# Patient Record
Sex: FEMALE | Race: BLACK OR AFRICAN AMERICAN | NOT HISPANIC OR LATINO | Employment: FULL TIME | URBAN - METROPOLITAN AREA
[De-identification: names, ages, dates, MRNs, and addresses within clinical notes are randomized per-mention and may not be internally consistent; named-entity substitution may affect disease eponyms.]

---

## 2023-02-02 ENCOUNTER — APPOINTMENT (OUTPATIENT)
Dept: GENERAL RADIOLOGY | Facility: HOSPITAL | Age: 23
End: 2023-02-02
Payer: MEDICAID

## 2023-02-02 ENCOUNTER — HOSPITAL ENCOUNTER (EMERGENCY)
Facility: HOSPITAL | Age: 23
Discharge: HOME | End: 2023-02-02
Attending: EMERGENCY MEDICINE | Admitting: EMERGENCY MEDICINE
Payer: MEDICAID

## 2023-02-02 VITALS
TEMPERATURE: 97.7 F | RESPIRATION RATE: 16 BRPM | BODY MASS INDEX: 35.47 KG/M2 | OXYGEN SATURATION: 100 % | HEART RATE: 61 BPM | DIASTOLIC BLOOD PRESSURE: 92 MMHG | WEIGHT: 226 LBS | SYSTOLIC BLOOD PRESSURE: 142 MMHG | HEIGHT: 67 IN

## 2023-02-02 DIAGNOSIS — E03.8 SUBCLINICAL HYPOTHYROIDISM: Primary | ICD-10-CM

## 2023-02-02 DIAGNOSIS — M54.2 NECK PAIN: ICD-10-CM

## 2023-02-02 LAB
ANION GAP SERPL CALCULATED.3IONS-SCNC: 10 MMOL/L (ref 5–15)
B-HCG UR QL: NEGATIVE
BASOPHILS # BLD AUTO: 0.03 10*3/MM3 (ref 0–0.2)
BASOPHILS NFR BLD AUTO: 0.4 % (ref 0–1.5)
BUN SERPL-MCNC: 9 MG/DL (ref 6–20)
BUN/CREAT SERPL: 15.8 (ref 7–25)
CALCIUM SPEC-SCNC: 9.3 MG/DL (ref 8.6–10.5)
CHLORIDE SERPL-SCNC: 106 MMOL/L (ref 98–107)
CO2 SERPL-SCNC: 24 MMOL/L (ref 22–29)
CREAT SERPL-MCNC: 0.57 MG/DL (ref 0.57–1)
DEPRECATED RDW RBC AUTO: 45.5 FL (ref 37–54)
EGFRCR SERPLBLD CKD-EPI 2021: 132.8 ML/MIN/1.73
EOSINOPHIL # BLD AUTO: 0.3 10*3/MM3 (ref 0–0.4)
EOSINOPHIL NFR BLD AUTO: 4.4 % (ref 0.3–6.2)
ERYTHROCYTE [DISTWIDTH] IN BLOOD BY AUTOMATED COUNT: 14.4 % (ref 12.3–15.4)
EXPIRATION DATE: NORMAL
GLUCOSE SERPL-MCNC: 92 MG/DL (ref 65–99)
HCT VFR BLD AUTO: 41.3 % (ref 34–46.6)
HGB BLD-MCNC: 13.5 G/DL (ref 12–15.9)
IMM GRANULOCYTES # BLD AUTO: 0.02 10*3/MM3 (ref 0–0.05)
IMM GRANULOCYTES NFR BLD AUTO: 0.3 % (ref 0–0.5)
INTERNAL NEGATIVE CONTROL: NEGATIVE
INTERNAL POSITIVE CONTROL: POSITIVE
LYMPHOCYTES # BLD AUTO: 1.4 10*3/MM3 (ref 0.7–3.1)
LYMPHOCYTES NFR BLD AUTO: 20.3 % (ref 19.6–45.3)
Lab: NORMAL
MCH RBC QN AUTO: 28.1 PG (ref 26.6–33)
MCHC RBC AUTO-ENTMCNC: 32.7 G/DL (ref 31.5–35.7)
MCV RBC AUTO: 86 FL (ref 79–97)
MONOCYTES # BLD AUTO: 0.49 10*3/MM3 (ref 0.1–0.9)
MONOCYTES NFR BLD AUTO: 7.1 % (ref 5–12)
NEUTROPHILS NFR BLD AUTO: 4.64 10*3/MM3 (ref 1.7–7)
NEUTROPHILS NFR BLD AUTO: 67.5 % (ref 42.7–76)
NRBC BLD AUTO-RTO: 0 /100 WBC (ref 0–0.2)
PLATELET # BLD AUTO: 221 10*3/MM3 (ref 140–450)
PMV BLD AUTO: 10 FL (ref 6–12)
POTASSIUM SERPL-SCNC: 4.2 MMOL/L (ref 3.5–5.2)
RBC # BLD AUTO: 4.8 10*6/MM3 (ref 3.77–5.28)
SODIUM SERPL-SCNC: 140 MMOL/L (ref 136–145)
T4 FREE SERPL-MCNC: 0.98 NG/DL (ref 0.93–1.7)
TSH SERPL DL<=0.05 MIU/L-ACNC: 6.78 UIU/ML (ref 0.27–4.2)
WBC NRBC COR # BLD: 6.88 10*3/MM3 (ref 3.4–10.8)

## 2023-02-02 PROCEDURE — 80048 BASIC METABOLIC PNL TOTAL CA: CPT | Performed by: NURSE PRACTITIONER

## 2023-02-02 PROCEDURE — 99282 EMERGENCY DEPT VISIT SF MDM: CPT

## 2023-02-02 PROCEDURE — 72040 X-RAY EXAM NECK SPINE 2-3 VW: CPT

## 2023-02-02 PROCEDURE — 84443 ASSAY THYROID STIM HORMONE: CPT | Performed by: NURSE PRACTITIONER

## 2023-02-02 PROCEDURE — 84439 ASSAY OF FREE THYROXINE: CPT | Performed by: NURSE PRACTITIONER

## 2023-02-02 PROCEDURE — 36415 COLL VENOUS BLD VENIPUNCTURE: CPT

## 2023-02-02 PROCEDURE — 81025 URINE PREGNANCY TEST: CPT | Performed by: NURSE PRACTITIONER

## 2023-02-02 PROCEDURE — 85025 COMPLETE CBC W/AUTO DIFF WBC: CPT | Performed by: NURSE PRACTITIONER

## 2023-02-02 RX ORDER — FLUCONAZOLE 150 MG/1
TABLET ORAL
COMMUNITY
Start: 2023-01-30 | End: 2023-02-02

## 2023-04-13 ENCOUNTER — HOSPITAL ENCOUNTER (EMERGENCY)
Facility: HOSPITAL | Age: 23
Discharge: HOME | End: 2023-04-13
Attending: EMERGENCY MEDICINE | Admitting: EMERGENCY MEDICINE
Payer: MEDICAID

## 2023-04-13 VITALS
TEMPERATURE: 98.6 F | DIASTOLIC BLOOD PRESSURE: 87 MMHG | SYSTOLIC BLOOD PRESSURE: 146 MMHG | OXYGEN SATURATION: 97 % | RESPIRATION RATE: 13 BRPM | HEART RATE: 94 BPM | HEIGHT: 67 IN | WEIGHT: 225 LBS | BODY MASS INDEX: 35.31 KG/M2

## 2023-04-13 DIAGNOSIS — M54.42 ACUTE LEFT-SIDED BACK PAIN WITH SCIATICA: Primary | ICD-10-CM

## 2023-04-13 DIAGNOSIS — N39.0 ACUTE UTI: ICD-10-CM

## 2023-04-13 LAB
B-HCG UR QL: NEGATIVE
BACTERIA UR QL AUTO: ABNORMAL /HPF
BILIRUB UR QL STRIP: NEGATIVE
CLARITY UR: CLEAR
COLOR UR: YELLOW
EXPIRATION DATE: NORMAL
GLUCOSE UR STRIP-MCNC: NEGATIVE MG/DL
HGB UR QL STRIP.AUTO: NEGATIVE
HYALINE CASTS UR QL AUTO: ABNORMAL /LPF
INTERNAL NEGATIVE CONTROL: NEGATIVE
INTERNAL POSITIVE CONTROL: POSITIVE
KETONES UR QL STRIP: NEGATIVE
LEUKOCYTE ESTERASE UR QL STRIP.AUTO: ABNORMAL
Lab: NORMAL
NITRITE UR QL STRIP: NEGATIVE
PH UR STRIP.AUTO: 5.5 [PH] (ref 5–8)
PROT UR QL STRIP: NEGATIVE
RBC # UR STRIP: ABNORMAL /HPF
REF LAB TEST METHOD: ABNORMAL
SP GR UR STRIP: 1.02 (ref 1–1.03)
SQUAMOUS #/AREA URNS HPF: ABNORMAL /HPF
UROBILINOGEN UR QL STRIP: ABNORMAL
WBC # UR STRIP: ABNORMAL /HPF

## 2023-04-13 PROCEDURE — 25010000002 KETOROLAC TROMETHAMINE PER 15 MG: Performed by: PHYSICIAN ASSISTANT

## 2023-04-13 PROCEDURE — 99283 EMERGENCY DEPT VISIT LOW MDM: CPT

## 2023-04-13 PROCEDURE — 81001 URINALYSIS AUTO W/SCOPE: CPT | Performed by: EMERGENCY MEDICINE

## 2023-04-13 PROCEDURE — 96372 THER/PROPH/DIAG INJ SC/IM: CPT

## 2023-04-13 PROCEDURE — 81025 URINE PREGNANCY TEST: CPT | Performed by: EMERGENCY MEDICINE

## 2023-04-13 RX ORDER — CYCLOBENZAPRINE HCL 10 MG
10 TABLET ORAL 3 TIMES DAILY PRN
Qty: 12 TABLET | Refills: 0 | Status: SHIPPED | OUTPATIENT
Start: 2023-04-13

## 2023-04-13 RX ORDER — KETOROLAC TROMETHAMINE 30 MG/ML
60 INJECTION, SOLUTION INTRAMUSCULAR; INTRAVENOUS ONCE
Status: COMPLETED | OUTPATIENT
Start: 2023-04-13 | End: 2023-04-13

## 2023-04-13 RX ORDER — SODIUM CHLORIDE 9 MG/ML
10 INJECTION INTRAVENOUS AS NEEDED
Status: DISCONTINUED | OUTPATIENT
Start: 2023-04-13 | End: 2023-04-13

## 2023-04-13 RX ADMIN — KETOROLAC TROMETHAMINE 60 MG: 30 INJECTION, SOLUTION INTRAMUSCULAR; INTRAVENOUS at 12:51

## 2023-04-13 NOTE — ED PROVIDER NOTES
Subjective   History of Present Illness  22-year-old female presents emergency department today complaining of pain in the left buttock.  She reports the pain is in the left buttock and goes down to the left leg.  She had no loss of bowel or bladder control.  She denies any dysuria frequency urgency or hematuria.  She had no vaginal discharge no vaginal bleeding.  She states that she had a kidney stone in the past.  But has no pain in the abdomen.  She reports driving over here she is unable to sit normally had to lean to the right side due to the pain in the left buttock.    History provided by:  Patient   used: No    Back Pain  Location:  Sacro-iliac joint  Quality:  Aching and burning  Radiates to:  L posterior upper leg  Pain severity:  Severe  Onset quality:  Gradual  Duration:  2 days  Timing:  Constant  Progression:  Waxing and waning  Chronicity:  New  Context: not falling, not jumping from heights, not MVA, not occupational injury, not recent illness, not recent injury and not twisting    Relieved by: Not sitting on the left buttock.  Worsened by:  Sitting, twisting and bending  Ineffective treatments:  None tried  Associated symptoms: leg pain    Associated symptoms: no abdominal pain, no bladder incontinence, no bowel incontinence, no chest pain, no dysuria, no fever, no headaches, no numbness, no paresthesias, no perianal numbness, no weakness and no weight loss    Risk factors: no hx of osteoporosis, not obese, no steroid use and no vascular disease        Review of Systems   Constitutional: Negative for chills, fever and weight loss.   Respiratory: Negative for chest tightness, shortness of breath and wheezing.    Cardiovascular: Negative for chest pain and palpitations.   Gastrointestinal: Negative for abdominal pain, bowel incontinence, diarrhea and vomiting.   Genitourinary: Negative for bladder incontinence, dysuria, frequency and urgency.   Musculoskeletal: Positive for back  pain. Negative for neck pain.   Skin: Negative for pallor and rash.   Neurological: Negative for weakness, numbness, headaches and paresthesias.   Psychiatric/Behavioral: Negative.    All other systems reviewed and are negative.      Past Medical History:   Diagnosis Date   • Hypertension    • Hypothyroidism    • Lichen sclerosus        Allergies   Allergen Reactions   • Augmentin [Amoxicillin-Pot Clavulanate] Rash       Past Surgical History:   Procedure Laterality Date   • KNEE SURGERY Bilateral     bilateral ACL   • TONSILLECTOMY         History reviewed. No pertinent family history.    Social History     Socioeconomic History   • Marital status: Single   Tobacco Use   • Smoking status: Some Days     Types: Cigarettes   • Smokeless tobacco: Never   Vaping Use   • Vaping Use: Every day   • Substances: Nicotine   Substance and Sexual Activity   • Alcohol use: Not Currently   • Drug use: Yes     Types: Marijuana           Objective   Physical Exam  Vitals and nursing note reviewed.   Constitutional:       Appearance: She is well-developed.   HENT:      Head: Normocephalic and atraumatic.      Right Ear: External ear normal.      Left Ear: External ear normal.      Nose: Nose normal.   Eyes:      General: No scleral icterus.     Conjunctiva/sclera: Conjunctivae normal.      Pupils: Pupils are equal, round, and reactive to light.   Neck:      Thyroid: No thyromegaly.   Cardiovascular:      Rate and Rhythm: Normal rate and regular rhythm.      Heart sounds: Normal heart sounds.   Pulmonary:      Effort: Pulmonary effort is normal. No respiratory distress.      Breath sounds: Normal breath sounds. No wheezing or rales.   Chest:      Chest wall: No tenderness.   Abdominal:      General: Bowel sounds are normal. There is no distension.      Palpations: Abdomen is soft.      Tenderness: There is no abdominal tenderness.   Musculoskeletal:         General: Normal range of motion.      Cervical back: Normal range of motion.  "     Comments: Patient is tender in the left SI joint.  There is no rash no lesions.  Strength 5/5 bilateral lower extremities.  Detert flexes plus one of the knees plus one of the ankles.   Lymphadenopathy:      Cervical: No cervical adenopathy.   Skin:     General: Skin is warm and dry.   Neurological:      Mental Status: She is alert and oriented to person, place, and time.      Cranial Nerves: No cranial nerve deficit.      Coordination: Coordination normal.      Deep Tendon Reflexes: Reflexes are normal and symmetric. Reflexes normal.   Psychiatric:         Behavior: Behavior normal.         Thought Content: Thought content normal.         Judgment: Judgment normal.         Procedures           ED Course  ED Course as of 04/14/23 2023   Thu Apr 13, 2023   1236 Reexamination the patient was sleeping in the room.  We discussed the urine very mild UTI.  No evidence of kidney stone. [HERB]      ED Course User Index  [HERB] Young Zhang PA                 No results found for this or any previous visit (from the past 24 hour(s)).  Note: In addition to lab results from this visit, the labs listed above may include labs taken at another facility or during a different encounter within the last 24 hours. Please correlate lab times with ED admission and discharge times for further clarification of the services performed during this visit.    No orders to display     Vitals:    04/13/23 1115   BP: 146/87   BP Location: Left arm   Patient Position: Sitting   Pulse: 94   Resp: 13   Temp: 98.6 °F (37 °C)   TempSrc: Oral   SpO2: 97%   Weight: 102 kg (225 lb)   Height: 170.2 cm (67\")     Medications   ketorolac (TORADOL) injection 60 mg (60 mg Intramuscular Given 4/13/23 1251)     ECG/EMG Results (last 24 hours)     ** No results found for the last 24 hours. **        No orders to display                                  Medical Decision Making  Pain in the left SI difficult to sit she has a urinalysis that may have a " very slight UTI.  We will treat that with a 3-day course of antibiotics.  She be given Toradol here this clinically is a acute sciatica.  Differential diagnosis #1 pyelonephritis #2 acute sciatica #3 HNP    Acute left-sided back pain with sciatica: acute illness or injury  Acute UTI: acute illness or injury  Amount and/or Complexity of Data Reviewed  Labs: ordered. Decision-making details documented in ED Course.      Risk  Prescription drug management.          Final diagnoses:   Acute left-sided back pain with sciatica   Acute UTI       ED Disposition  ED Disposition     ED Disposition   Discharge    Condition   Stable    Comment   --             Kari Gann, APRN  [address]  [facility] [address] [phone]               Medication List      New Prescriptions    cyclobenzaprine 10 MG tablet  Commonly known as: FLEXERIL  Take 1 tablet by mouth 3 (Three) Times a Day As Needed for Muscle Spasms.     diclofenac 50 MG EC tablet  Commonly known as: VOLTAREN  Take 1 tablet by mouth 3 (Three) Times a Day.           Where to Get Your Medications      These medications were sent to [id number] - [place], [place] - [address] AT [facility] [address] - [phone]  - [phone] FX  [address], located within [facility] [address]    Phone: [phone]   · cyclobenzaprine 10 MG tablet  · diclofenac 50 MG EC tablet          Young Zhang PA  04/14/23 2023

## 2023-04-13 NOTE — Clinical Note
[facility] EMERGENCY DEPARTMENT  [address] [address]  Phone: [phone]    Onel Zapata was seen and treated in our emergency department on 4/13/2023.  She may return to work on 04/15/2023.         Thank you for choosing [facility].    Young Zhang PA

## 2024-02-07 ENCOUNTER — OFFICE VISIT (OUTPATIENT)
Dept: OBSTETRICS AND GYNECOLOGY | Facility: CLINIC | Age: 24
End: 2024-02-07
Payer: MEDICAID

## 2024-02-07 VITALS
WEIGHT: 224 LBS | SYSTOLIC BLOOD PRESSURE: 130 MMHG | DIASTOLIC BLOOD PRESSURE: 80 MMHG | HEIGHT: 67 IN | BODY MASS INDEX: 35.16 KG/M2

## 2024-02-07 DIAGNOSIS — O20.0 THREATENED ABORTION: Primary | ICD-10-CM

## 2024-02-07 NOTE — PROGRESS NOTES
"    Chief Complaint   Patient presents with    Threatened Miscarriage     NGYN          [facility]  Onel Zapata is a 23 y.o. female, , who presents for a NOB visit.    Recent Tests:  She had a urine pregnancy test that was done 2.5 weeks ago that was positive..    US today: Yes.  Findings showed size less than dates,and double bleb appearance.  I have personally evaluated the U/S and agree with the findings.   She has not had prenatal care.  She complains of cramping pain.  The pain is located in her lower abdomen.  Her past medical history is CHTN, thyroid disease, and anxiety.  She does not have passage of tissue.  Rh Status: Unknown  She c/o occasional nausea, vomiting and pink vaginal spotting.    The additional following portions of the patient's history were reviewed and updated as appropriate: allergies, current medications, past family history, past medical history, past social history, past surgical history, and problem list.    Review of Systems  All other systems reviewed and are negative.     I have reviewed and agree with the HPI, ROS, and historical information as entered above. Edilia Munoz MD      Objective   /80   Ht 170.2 cm (67\")   Wt 102 kg (224 lb)   LMP 2023 (Exact Date)   BMI 35.08 kg/m²     Physical Exam  Vitals and nursing note reviewed.   Constitutional:       Appearance: Normal appearance.   HENT:      Head: Normocephalic and atraumatic.   Eyes:      General: No scleral icterus.     Pupils: Pupils are equal, round, and reactive to light.   Pulmonary:      Effort: Pulmonary effort is normal.      Breath sounds: Normal breath sounds.   Abdominal:      General: Bowel sounds are normal. There is no distension.      Palpations: Abdomen is soft.      Tenderness: There is no abdominal tenderness.   Musculoskeletal:         General: Normal range of motion.      Cervical back: Normal range of motion and neck supple.      Right lower leg: No edema.      Left lower leg: No edema. "   Skin:     General: Skin is warm and dry.   Neurological:      General: No focal deficit present.      Mental Status: She is alert and oriented to person, place, and time.   Psychiatric:         Mood and Affect: Mood normal.         Behavior: Behavior normal. Behavior is cooperative.            Assessment and Plan    Problem List Items Addressed This Visit    None  Visit Diagnoses       Threatened     -  Primary    Relevant Orders    TSH    ABO / Rh    US Ob Transvaginal            Threatened AB  US inconsistent with LMP today, and FP with uncertain cardiac activity.  Return in about 1 week (around 2024) for Recheck, U/S Next Visit.    I spent 30 minutes caring for Onel on this date of service. This time includes time spent by me in the following activities:preparing for the visit, reviewing tests, obtaining and/or reviewing a separately obtained history, counseling and educating the patient/family/caregiver, ordering medications, tests, or procedures, and documenting information in the medical record        Edilia Munoz MD  2024

## 2024-02-08 ENCOUNTER — TELEPHONE (OUTPATIENT)
Dept: OBSTETRICS AND GYNECOLOGY | Facility: CLINIC | Age: 24
End: 2024-02-08
Payer: MEDICAID

## 2024-02-08 LAB
ABO GROUP BLD: NORMAL
RH BLD: POSITIVE
TSH SERPL DL<=0.005 MIU/L-ACNC: 4.92 UIU/ML (ref 0.45–4.5)

## 2024-02-08 NOTE — TELEPHONE ENCOUNTER
OB PATIENT SAID SHE IS BLEEDING HEAVIER NOW AND HAS SOME CLOT; WANTING TO KNOW WHAT SHE SHOULD DO.

## 2024-02-08 NOTE — TELEPHONE ENCOUNTER
KS pt.   MBT: A +    Last seen in office yesterday. She had US performed resulting inconsistent with LMP , and FP with uncertain cardiac activity. Recheck in one week with US scheduled.     SW pt. She reports increase in bleeding and passing of clots. RN instructed pt to implement pelvic rest and report to ED if saturating more than 1 pad per hour. Pt VU.

## 2024-02-08 NOTE — TELEPHONE ENCOUNTER
[facility] staff attempted to follow warm transfer process and was unsuccessful     Caller: Onel Zapata    Relationship to patient: Self    Best call back number: [phone] CALL ANYTIME, IT IS OKAY TO [facility].    Patient is needing: PATIENT IS REQUESTING TO SPEAK WITH CLINICAL. CONFIRMED PREGNANCY AT APPT ON 02/07/24. PATIENT HAD SOME SPOTTING YESTERDAY. AFTER APPT SPOTTING HAS INCREASED. BLOOD IS BRIGHT RED TO BROWN WITH SOME TINY BLOOD CLOTS. BLOOD IS NOTICEABLE WHEN WIPING AFTER USING THE RESTROOM. PATIENT IS WEARING A PAD AND NO BLOOD ON PAD. SOME OCCASIONAL PERIOD LIKE CRAMPING THAT IS WORSE WHEN STANDING.    PATIENT WANTS TO SEE IF AN APPT IS NEEDED OR GO TO EMERGENCY ROOM? IF APPT IS NEEDED, PATIENT IS OKAY WITH GOING TO EITHER OFFICE LOCATION. [facility] ADVISED PATIENT TO GO TO EMERGENCY ROOM IF STARTS BLEEDING THROUGH MORE THAN ONE PAD PER HOUR OR PAIN BECOMES SEVERE OR UNBEARABLE.

## 2024-02-08 NOTE — TELEPHONE ENCOUNTER
Patient spoke to the [place] office. Pelvic rest. Go to ER if patient bleeds through a pad in an hour or less. Patient verbalized understanding

## 2024-02-14 ENCOUNTER — OFFICE VISIT (OUTPATIENT)
Dept: OBSTETRICS AND GYNECOLOGY | Facility: CLINIC | Age: 24
End: 2024-02-14
Payer: MEDICAID

## 2024-02-14 VITALS
SYSTOLIC BLOOD PRESSURE: 118 MMHG | BODY MASS INDEX: 35.16 KG/M2 | WEIGHT: 224 LBS | HEIGHT: 67 IN | DIASTOLIC BLOOD PRESSURE: 70 MMHG

## 2024-02-14 DIAGNOSIS — O03.9 MISCARRIAGE: Primary | ICD-10-CM

## 2024-02-28 ENCOUNTER — OFFICE VISIT (OUTPATIENT)
Dept: OBSTETRICS AND GYNECOLOGY | Facility: CLINIC | Age: 24
End: 2024-02-28
Payer: MEDICAID

## 2024-02-28 VITALS
HEIGHT: 67 IN | WEIGHT: 227.2 LBS | SYSTOLIC BLOOD PRESSURE: 124 MMHG | BODY MASS INDEX: 35.66 KG/M2 | DIASTOLIC BLOOD PRESSURE: 70 MMHG

## 2024-02-28 DIAGNOSIS — O03.9 MISCARRIAGE: ICD-10-CM

## 2024-02-28 DIAGNOSIS — Z30.011 ENCOUNTER FOR INITIAL PRESCRIPTION OF CONTRACEPTIVE PILLS: ICD-10-CM

## 2024-02-28 DIAGNOSIS — N91.2 AMENORRHEA: Primary | ICD-10-CM

## 2024-02-28 RX ORDER — NORGESTIMATE AND ETHINYL ESTRADIOL 0.25-0.035
1 KIT ORAL DAILY
Qty: 28 TABLET | Refills: 12 | Status: SHIPPED | OUTPATIENT
Start: 2024-02-28

## 2024-02-28 NOTE — PROGRESS NOTES
Chief Complaint   Patient presents with    Follow-up     SAB follow up       Subjective   HPI  Onel Zapata is a 23 y.o. female, , who presents for spontaneous missed  follow up. She was last seen in office on 2024 with ultrasound that showed GS in cervix. POC removed in office, and Patient reports that bleeding has subsided since that appointment. She denies any pain.     She is wanting to discuss starting birth control. Patient states that she is interested in possibly starting an oral contraceptive.       Additional OB/GYN History   Current contraception: contraceptive methods: None  Desires to: start contraception  Last Pap :   Last Completed Pap Smear       This patient has no relevant Health Maintenance data.          History of abnormal Pap smear: no  Last mammogram:   Last Completed Mammogram       This patient has no relevant Health Maintenance data.          Tobacco Usage?: No   OB History          1    Para        Term                AB   1    Living             SAB   1    IAB        Ectopic        Molar        Multiple        Live Births                    Health Maintenance   Topic Date Due    Annual Gynecologic Pelvic and Breast Exam  Never done    BMI FOLLOWUP  Never done    Pneumococcal Vaccine 0-64 (1 of 2 - PCV) Never done    HPV VACCINES (1 - 2-dose series) Never done    TDAP/TD VACCINES (2 - Td or Tdap) 2022    INFLUENZA VACCINE  Never done    COVID-19 Vaccine (3 - -24 season) 2023    HEPATITIS C SCREENING  Never done    ANNUAL PHYSICAL  Never done    CHLAMYDIA SCREENING  Never done    PAP SMEAR  Never done    MENINGOCOCCAL VACCINE  Completed       The additional following portions of the patient's history were reviewed and updated as appropriate: allergies, current medications, past family history, past medical history, past social history, past surgical history, and problem list.    Review of Systems    I have reviewed and agree with the HPI,  "ROS, and historical information as entered above. Edilia Munoz MD    Objective   /70   Ht 170.2 cm (67\")   Wt 103 kg (227 lb 3.2 oz)   LMP  (LMP Unknown)   BMI 35.58 kg/m²     Physical Exam  Vitals and nursing note reviewed.   Constitutional:       Appearance: Normal appearance.   HENT:      Head: Normocephalic and atraumatic.   Eyes:      General: No scleral icterus.     Pupils: Pupils are equal, round, and reactive to light.   Pulmonary:      Effort: Pulmonary effort is normal.      Breath sounds: Normal breath sounds.   Abdominal:      General: Bowel sounds are normal. There is no distension.      Palpations: Abdomen is soft.      Tenderness: There is no abdominal tenderness.   Musculoskeletal:         General: Normal range of motion.      Cervical back: Normal range of motion and neck supple.      Right lower leg: No edema.      Left lower leg: No edema.   Skin:     General: Skin is warm and dry.   Neurological:      General: No focal deficit present.      Mental Status: She is alert and oriented to person, place, and time.   Psychiatric:         Mood and Affect: Mood normal.         Behavior: Behavior normal. Behavior is cooperative.         Assessment & Plan     Assessment     Problem List Items Addressed This Visit    None  Visit Diagnoses       Amenorrhea    -  Primary    Relevant Orders    HCG, B-subunit, Quantitative    Miscarriage        now completed    Relevant Orders    TSH+Free T4    Encounter for initial prescription of contraceptive pills        Relevant Orders    TSH+Free T4            Appears to be a completed Ab at this time. Check hcg today.   Discussed options for contraception and she desires to start GARFIELD.   Needs TFTs checked, and referral to PCP    Plan     No follow-ups on file.  I spent 20 minutes caring for Onel on this date of service. This time includes time spent by me in the following activities:preparing for the visit, reviewing tests, obtaining and/or reviewing a " separately obtained history, ordering medications, tests, or procedures, and documenting information in the medical record        Edilia Munoz MD  02/28/2024

## 2024-03-06 ENCOUNTER — OFFICE VISIT (OUTPATIENT)
Dept: FAMILY MEDICINE CLINIC | Facility: CLINIC | Age: 24
End: 2024-03-06
Payer: MEDICAID

## 2024-03-06 VITALS
SYSTOLIC BLOOD PRESSURE: 112 MMHG | RESPIRATION RATE: 18 BRPM | DIASTOLIC BLOOD PRESSURE: 66 MMHG | HEIGHT: 67 IN | WEIGHT: 225.2 LBS | HEART RATE: 90 BPM | BODY MASS INDEX: 35.35 KG/M2 | TEMPERATURE: 98 F | OXYGEN SATURATION: 100 %

## 2024-03-06 DIAGNOSIS — E06.3 HYPOTHYROIDISM DUE TO HASHIMOTO'S THYROIDITIS: Primary | ICD-10-CM

## 2024-03-06 DIAGNOSIS — R53.82 CHRONIC FATIGUE: ICD-10-CM

## 2024-03-06 DIAGNOSIS — E03.8 HYPOTHYROIDISM DUE TO HASHIMOTO'S THYROIDITIS: Primary | ICD-10-CM

## 2024-03-06 DIAGNOSIS — E55.9 VITAMIN D DEFICIENCY: ICD-10-CM

## 2024-03-06 DIAGNOSIS — O03.9 MISCARRIAGE: ICD-10-CM

## 2024-03-06 DIAGNOSIS — E01.0 THYROMEGALY: ICD-10-CM

## 2024-03-06 LAB
HCG INTACT+B SERPL-ACNC: 35 MIU/ML
T4 FREE SERPL DIALY-MCNC: 0.94 NG/DL
TSH SERPL-ACNC: 5.4 UU/ML

## 2024-03-07 LAB
25(OH)D3+25(OH)D2 SERPL-MCNC: 17.4 NG/ML (ref 30–100)
ALBUMIN SERPL-MCNC: 4.5 G/DL (ref 4–5)
ALBUMIN/GLOB SERPL: 1.8 {RATIO} (ref 1.2–2.2)
ALP SERPL-CCNC: 58 IU/L (ref 44–121)
ALT SERPL-CCNC: 10 IU/L (ref 0–32)
AST SERPL-CCNC: 17 IU/L (ref 0–40)
BASOPHILS # BLD AUTO: 0 X10E3/UL (ref 0–0.2)
BASOPHILS NFR BLD AUTO: 1 %
BILIRUB SERPL-MCNC: <0.2 MG/DL (ref 0–1.2)
BUN SERPL-MCNC: 9 MG/DL (ref 6–20)
BUN/CREAT SERPL: 12 (ref 9–23)
CALCIUM SERPL-MCNC: 8.9 MG/DL (ref 8.7–10.2)
CHLORIDE SERPL-SCNC: 105 MMOL/L (ref 96–106)
CO2 SERPL-SCNC: 21 MMOL/L (ref 20–29)
CREAT SERPL-MCNC: 0.74 MG/DL (ref 0.57–1)
EGFRCR SERPLBLD CKD-EPI 2021: 117 ML/MIN/1.73
EOSINOPHIL # BLD AUTO: 0.7 X10E3/UL (ref 0–0.4)
EOSINOPHIL NFR BLD AUTO: 18 %
ERYTHROCYTE [DISTWIDTH] IN BLOOD BY AUTOMATED COUNT: 13.2 % (ref 11.7–15.4)
FOLATE SERPL-MCNC: 11 NG/ML
GLOBULIN SER CALC-MCNC: 2.5 G/DL (ref 1.5–4.5)
GLUCOSE SERPL-MCNC: 84 MG/DL (ref 70–99)
HCG INTACT+B SERPL-ACNC: 7 MIU/ML
HCT VFR BLD AUTO: 38 % (ref 34–46.6)
HGB BLD-MCNC: 12.5 G/DL (ref 11.1–15.9)
IMM GRANULOCYTES # BLD AUTO: 0 X10E3/UL (ref 0–0.1)
IMM GRANULOCYTES NFR BLD AUTO: 0 %
IRON SATN MFR SERPL: 12 % (ref 15–55)
IRON SERPL-MCNC: 35 UG/DL (ref 27–159)
LYMPHOCYTES # BLD AUTO: 1.7 X10E3/UL (ref 0.7–3.1)
LYMPHOCYTES NFR BLD AUTO: 39 %
MCH RBC QN AUTO: 29.1 PG (ref 26.6–33)
MCHC RBC AUTO-ENTMCNC: 32.9 G/DL (ref 31.5–35.7)
MCV RBC AUTO: 88 FL (ref 79–97)
MONOCYTES # BLD AUTO: 0.4 X10E3/UL (ref 0.1–0.9)
MONOCYTES NFR BLD AUTO: 9 %
NEUTROPHILS # BLD AUTO: 1.4 X10E3/UL (ref 1.4–7)
NEUTROPHILS NFR BLD AUTO: 33 %
PLATELET # BLD AUTO: 209 X10E3/UL (ref 150–450)
POTASSIUM SERPL-SCNC: 4.1 MMOL/L (ref 3.5–5.2)
PROT SERPL-MCNC: 7 G/DL (ref 6–8.5)
RBC # BLD AUTO: 4.3 X10E6/UL (ref 3.77–5.28)
SODIUM SERPL-SCNC: 143 MMOL/L (ref 134–144)
T3FREE SERPL-MCNC: 3.4 PG/ML (ref 2–4.4)
T4 FREE SERPL-MCNC: 0.92 NG/DL (ref 0.82–1.77)
THYROGLOB AB SERPL-ACNC: 2.7 IU/ML (ref 0–0.9)
THYROPEROXIDASE AB SERPL-ACNC: 393 IU/ML (ref 0–34)
TIBC SERPL-MCNC: 290 UG/DL (ref 250–450)
TSH SERPL DL<=0.005 MIU/L-ACNC: 6.76 UIU/ML (ref 0.45–4.5)
UIBC SERPL-MCNC: 255 UG/DL (ref 131–425)
VIT B12 SERPL-MCNC: 400 PG/ML (ref 232–1245)
WBC # BLD AUTO: 4.2 X10E3/UL (ref 3.4–10.8)

## 2024-03-11 ENCOUNTER — TELEPHONE (OUTPATIENT)
Dept: FAMILY MEDICINE CLINIC | Facility: CLINIC | Age: 24
End: 2024-03-11

## 2024-03-11 DIAGNOSIS — E03.8 HYPOTHYROIDISM DUE TO HASHIMOTO'S THYROIDITIS: Primary | ICD-10-CM

## 2024-03-11 DIAGNOSIS — E06.3 HYPOTHYROIDISM DUE TO HASHIMOTO'S THYROIDITIS: Primary | ICD-10-CM

## 2024-03-11 RX ORDER — THYROID 30 MG/1
30 TABLET ORAL DAILY
Qty: 30 TABLET | Refills: 1 | Status: SHIPPED | OUTPATIENT
Start: 2024-03-11

## 2024-03-11 NOTE — TELEPHONE ENCOUNTER
Caller: Onel Zapata    Relationship: Self    Best call back number: [phone]    What test was performed: BLOOD WORK    When was the test performed: 03/06/24    Where was the test performed: OFFICE     Additional notes: PATIENT WOULD LIKE TO SPEAK WITH CLINICAL ABOUT RESULTS    PLEASE ADVISE

## 2024-03-15 ENCOUNTER — PATIENT ROUNDING (BHMG ONLY) (OUTPATIENT)
Dept: FAMILY MEDICINE CLINIC | Facility: CLINIC | Age: 24
End: 2024-03-15
Payer: MEDICAID

## 2024-03-15 PROBLEM — E03.8 HYPOTHYROIDISM DUE TO HASHIMOTO'S THYROIDITIS: Status: ACTIVE | Noted: 2024-03-15

## 2024-03-15 PROBLEM — E06.3 HYPOTHYROIDISM DUE TO HASHIMOTO'S THYROIDITIS: Status: ACTIVE | Noted: 2024-03-15

## 2024-03-15 PROBLEM — E01.0 THYROMEGALY: Status: ACTIVE | Noted: 2024-03-15

## 2024-03-15 NOTE — PROGRESS NOTES
A My-Chart message has been sent to the patient for PATIENT ROUNDING with [facility].

## 2024-05-08 DIAGNOSIS — E03.8 HYPOTHYROIDISM DUE TO HASHIMOTO'S THYROIDITIS: ICD-10-CM

## 2024-05-08 DIAGNOSIS — E06.3 HYPOTHYROIDISM DUE TO HASHIMOTO'S THYROIDITIS: ICD-10-CM

## 2024-05-10 ENCOUNTER — TELEPHONE (OUTPATIENT)
Dept: OBSTETRICS AND GYNECOLOGY | Facility: CLINIC | Age: 24
End: 2024-05-10

## 2024-05-10 RX ORDER — THYROID 30 MG/1
30 TABLET ORAL DAILY
Qty: 30 TABLET | Refills: 1 | Status: SHIPPED | OUTPATIENT
Start: 2024-05-10

## 2024-05-10 NOTE — TELEPHONE ENCOUNTER
Provider: DR JACOBS    Caller: IAIN MANRIQUEZ    Relationship to Patient: SELF    Phone Number: [phone]    Reason for Call: PATIENT CALLED AND STATED THAT SHE HAD A MISCARRIAGE IN FEBRUARY AND IS CONCERNED ABOUT RECENT ABNORMAL BLEEDING POSSIBLY BEING RELATED TO THE MISCARRIAGE OR A POSSIBLE INFECTION    When was the patient last seen: 2/28/24    When did it start: PATIENT ENDED PERIOD 5/5/24 AND STARTED BLEEDING AGAIN AFTER INTERCOURSE THE EVENING OF 5/7/24    Characteristics of symptom/severity: ABNORMAL BLEEDING AFTER INTERCOURSE - STARTED AS LIGHT BLEEDING THAT NIGHT AND THEN HEAVIER LIKE A PERIOD THE NEXT MORNING AND HASN'T STOPPED SINCE

## 2024-06-03 ENCOUNTER — TELEPHONE (OUTPATIENT)
Dept: OBSTETRICS AND GYNECOLOGY | Facility: CLINIC | Age: 24
End: 2024-06-03

## 2024-06-03 NOTE — TELEPHONE ENCOUNTER
Hub staff attempted to follow warm transfer process and was unsuccessful     Caller: Onel Zapata    Relationship to patient: Self    Best call back number: [phone]    Patient is needing: PT RETURNING MISSED CALL FROM JASMEET IN THE OFFICE.

## 2024-06-03 NOTE — TELEPHONE ENCOUNTER
Hub staff attempted to follow warm transfer process and was unsuccessful     Caller: IAIN MANRIQUEZ    Relationship to patient: SELF     Best call back number: [phone]     Patient is needing: PT IS CALLING IN TO SCHEDULE A NEW OB APPT. PT HAS A POSITIVE PREGNANCY TEST BUT LMP IS UNKNOWN BELIEVES LAST PERIOD WAS THE BEGINNING OF APRIL.    SPOKE TO NONCLINICAL AND ADVISED TO SEND TE FOR OB LAB WORK TO BE ORDERED BEFORE SCHEDULING

## 2024-06-04 ENCOUNTER — TELEPHONE (OUTPATIENT)
Dept: OBSTETRICS AND GYNECOLOGY | Facility: CLINIC | Age: 24
End: 2024-06-04
Payer: MEDICAID

## 2024-06-04 DIAGNOSIS — Z32.00 POSSIBLE PREGNANCY, NOT CONFIRMED: Primary | ICD-10-CM

## 2024-06-04 NOTE — TELEPHONE ENCOUNTER
Spoke with patient. Patient reports she had a positive UPT on Saturday. She reports her LMP is unknown. She thinks she may have had one at the beginning of April but is uncertain. She was on birth control but stopped with a positive UPT. Pt reports she had a miscarriage in Feb 2024. Pt to come in for an HCG and progesterone level. She will come in tomorrow as she is at work today. Orders placed.      -LAUREEN Huitron

## 2024-06-05 ENCOUNTER — LAB (OUTPATIENT)
Dept: OBSTETRICS AND GYNECOLOGY | Facility: CLINIC | Age: 24
End: 2024-06-05
Payer: MEDICAID

## 2024-06-06 LAB
HCG INTACT+B SERPL-ACNC: NORMAL MIU/ML
PROGEST SERPL-MCNC: 18.5 NG/ML

## 2024-07-10 ENCOUNTER — INITIAL PRENATAL (OUTPATIENT)
Dept: OBSTETRICS AND GYNECOLOGY | Facility: CLINIC | Age: 24
End: 2024-07-10
Payer: MEDICAID

## 2024-07-10 VITALS — BODY MASS INDEX: 34.77 KG/M2 | DIASTOLIC BLOOD PRESSURE: 72 MMHG | WEIGHT: 222 LBS | SYSTOLIC BLOOD PRESSURE: 118 MMHG

## 2024-07-10 DIAGNOSIS — Z86.79 HISTORY OF CHRONIC HYPERTENSION: ICD-10-CM

## 2024-07-10 DIAGNOSIS — F12.90 MARIJUANA USE DURING PREGNANCY: ICD-10-CM

## 2024-07-10 DIAGNOSIS — O99.330 TOBACCO USE DURING PREGNANCY, ANTEPARTUM: ICD-10-CM

## 2024-07-10 DIAGNOSIS — E03.8 HYPOTHYROIDISM DUE TO HASHIMOTO'S THYROIDITIS: ICD-10-CM

## 2024-07-10 DIAGNOSIS — Z3A.10 10 WEEKS GESTATION OF PREGNANCY: ICD-10-CM

## 2024-07-10 DIAGNOSIS — E06.3 HYPOTHYROIDISM DUE TO HASHIMOTO'S THYROIDITIS: ICD-10-CM

## 2024-07-10 DIAGNOSIS — O99.320 MARIJUANA USE DURING PREGNANCY: ICD-10-CM

## 2024-07-10 DIAGNOSIS — Z34.90 PRENATAL CARE, ANTEPARTUM: Primary | ICD-10-CM

## 2024-07-10 PROBLEM — E01.0 THYROMEGALY: Status: RESOLVED | Noted: 2024-03-15 | Resolved: 2024-07-10

## 2024-07-10 RX ORDER — PRENATAL VIT NO.126/IRON/FOLIC 28MG-0.8MG
TABLET ORAL DAILY
COMMUNITY

## 2024-07-10 NOTE — PROGRESS NOTES
Initial ob visit     CC- Here for care of pregnancy        Onel Zapata is a 23 y.o. female, , who presents for her first obstetrical visit.  Patient's last menstrual period was 2024.. Her JENISE is 2025, by Ultrasound. Current GA is 10w3d.     Initial positive test date : 24, UPT        Her periods are every 28 days.  Prior obstetric issues: none  Patient's past medical history is significant for: thyroid dysfunction.  She follows with Eliana Hodgson for treatment.  Family history of genetic issues (includes FOB): none  Prior infections concerning in pregnancy (Rash, fever in last 2 weeks): No  Varicella Hx - vaccinated  Prior testing for Cystic Fibrosis Carrier or Sickle Cell Trait- none  Prepregnancy BMI - Body mass index is 34.77 kg/m².  History of STD: no  Hx of HSV for patient or partner: no  Ultrasound Today: yes    OB History    Para Term  AB Living   2       1     SAB IAB Ectopic Molar Multiple Live Births   1                # Outcome Date GA Lbr Mukund/2nd Weight Sex Type Anes PTL Lv   2 Current            1 SAB                Additional Pertinent History   Last Pap : 2023 Result: normal per pt. Pt would like pap smear PP     Last Completed Pap Smear       This patient has no relevant Health Maintenance data.          History of abnormal Pap smear: no  Family history of uterine, colon, breast, or ovarian cancer: no  Feelings of Anxiety or Depression: no  Tobacco Usage?: Yes Onel Zapata  reports that she vapes nicotine daily. She has never used smokeless tobacco. I have educated her on the risk of diseases from using tobacco products such as cancer, COPD, heart disease, reproductive problems, and low birth weight.     I advised her to quit and she is not willing to quit.            Alcohol/Drug Use?: YES Drug: regular daily use  Over the age of 35 at delivery: no  Genetic Screening: desires cell free DNA    PMH    Current Outpatient Medications:     prenatal  vitamin (prenatal, CLASSIC, vitamin) tablet, Take  by mouth Daily., Disp: , Rfl:      Past Medical History:   Diagnosis Date    Arthritis     Hypertension     Hypothyroidism     Kidney stone     Lichen sclerosus     Ovarian cyst         Past Surgical History:   Procedure Laterality Date    KNEE SURGERY Bilateral     bilateral ACL    LAPAROSCOPIC CHOLECYSTECTOMY  2021    TONSILLECTOMY         Review of Systems   Review of Systems    Patient Reports:  nausea and occasional vomiting  Patient Denies:excessive nausea , excessive vomiting, and vaginal bleeding  All systems reviewed and otherwise normal.    I have reviewed and agree with the HPI, ROS, and historical information as entered above. Edilia Munoz MD      /72   Wt 101 kg (222 lb)   LMP 05/04/2024   BMI 34.77 kg/m²     The additional following portions of the patient's history were reviewed and updated as appropriate: allergies, current medications, past family history, past medical history, past social history, past surgical history, and problem list.    Physical Exam  General:  well developed; well nourished  no acute distress   Chest/Respiratory: No labored breathing, normal respiratory effort, normal appearance, no respiratory noises noted   Heart:  not examined   Thyroid: not examined   Breasts:  Not performed.   Abdomen: Not performed.   Pelvis: Not performed.        Assessment and Plan    Problem List Items Addressed This Visit          Gynecologic and Obstetric Problems    Marijuana use during pregnancy       Other    Hypothyroidism due to Hashimoto's thyroiditis    Overview     TSH 6 prior to nob, but not taking synthroid. Refer endocrine.          Relevant Orders    TSH    Ambulatory Referral to Endocrinology    Pregnancy    Overview     cfDNA         Tobacco use during pregnancy, antepartum    History of chronic hypertension    Overview     Resolved after weight loss 2-3 yrs ago. Was on 2 different meds. But normal since then   Baby asa           Other Visit Diagnoses       Prenatal care, antepartum    -  Primary    Relevant Orders    Obstetric Panel    HIV-1 / O / 2 Ag / Antibody    Urine Culture - Urine, Urine, Clean Catch    Urinalysis With Microscopic - Urine, Clean Catch    Chlamydia trachomatis, Neisseria gonorrhoeae, PCR - Urine, Urine, Clean Catch    Urine Drug Screen - Urine, Clean Catch    TSH    [id number] PLUS Core+SCA+ESS - Blood,            Pregnancy at 10w3d  Reviewed routine prenatal care with the office and educational materials given  Lab(s) Ordered  Discussed options for genetic testing including first trimester nuchal translucency screen, genetic disease carrier testing, quadruple screen, and NIPT  Discontinue the use of all non-medicinal drugs and chemicals  Patient is on Prenatal vitamins  Activity recommendation : 150 minutes/week of moderate intensity aerobic activity unless we limit for bleeding, hypertension or other pregnancy complication   TFT today  discussed baby aspirin from 10-36 weeks for prevention of preeclampsia   Return in about 1 month (around 8/10/2024) for F/U Prenatal.      Edilia Munoz MD  07/10/2024

## 2024-07-11 LAB — MED LIST OPTION NOT SELECTED: NORMAL

## 2024-07-12 RX ORDER — CEPHALEXIN 500 MG/1
500 CAPSULE ORAL 3 TIMES DAILY
Qty: 21 CAPSULE | Refills: 0 | Status: SHIPPED | OUTPATIENT
Start: 2024-07-12 | End: 2024-07-19

## 2024-07-14 LAB
5P15 DELETION (CRI-DU-CHAT): NOT DETECTED
ABO GROUP BLD: NORMAL
AMPHETAMINES UR QL SCN: NEGATIVE NG/ML
APPEARANCE UR: CLEAR
BACTERIA #/AREA URNS HPF: NORMAL /[HPF]
BACTERIA UR CULT: ABNORMAL
BARBITURATES UR QL SCN: NEGATIVE NG/ML
BASOPHILS # BLD AUTO: 0 X10E3/UL (ref 0–0.2)
BASOPHILS NFR BLD AUTO: 1 %
BENZODIAZ UR QL SCN: NEGATIVE NG/ML
BILIRUB UR QL STRIP: NEGATIVE
BLD GP AB SCN SERPL QL: NEGATIVE
BZE UR QL SCN: NEGATIVE NG/ML
C TRACH RRNA SPEC QL NAA+PROBE: NEGATIVE
CANNABINOIDS UR QL SCN: POSITIVE NG/ML
CASTS URNS QL MICRO: NORMAL /LPF
CFDNA.FET/CFDNA.TOTAL SFR FETUS: NORMAL %
CITATION REF LAB TEST: NORMAL
COLOR UR: YELLOW
CREAT UR-MCNC: 109.1 MG/DL (ref 20–300)
EOSINOPHIL # BLD AUTO: 0.2 X10E3/UL (ref 0–0.4)
EOSINOPHIL NFR BLD AUTO: 3 %
EPI CELLS #/AREA URNS HPF: NORMAL /HPF (ref 0–10)
ERYTHROCYTE [DISTWIDTH] IN BLOOD BY AUTOMATED COUNT: 13.6 % (ref 11.7–15.4)
FET 13+18+21+X+Y ANEUP PLAS.CFDNA: NEGATIVE
FET 1P36 DEL RISK WBC.DNA+CFDNA QL: NOT DETECTED
FET 22Q11.2 DEL RISK WBC.DNA+CFDNA QL: NOT DETECTED
FET CHR 11Q23 DEL PLAS.CFDNA QL: NOT DETECTED
FET CHR 15Q11 DEL PLAS.CFDNA QL: NOT DETECTED
FET CHR 21 TS PLAS.CFDNA QL: NEGATIVE
FET CHR 4P16 DEL PLAS.CFDNA QL: NOT DETECTED
FET CHR 8Q24 DEL PLAS.CFDNA QL: NOT DETECTED
FET MS X RISK WBC.DNA+CFDNA QL: NOT DETECTED
FET SEX PLAS.CFDNA DOSAGE CFDNA: NORMAL
FET TS 13 RISK PLAS.CFDNA QL: NEGATIVE
FET TS 18 RISK WBC.DNA+CFDNA QL: NEGATIVE
FET X + Y ANEUP RISK PLAS.CFDNA SEQ-IMP: NOT DETECTED
GA EST FROM CONCEPTION DATE: NORMAL D
GESTATIONAL AGE > 9:: YES
GLUCOSE UR QL STRIP: NEGATIVE
HBV SURFACE AG SERPL QL IA: NEGATIVE
HCT VFR BLD AUTO: 37.1 % (ref 34–46.6)
HCV IGG SERPL QL IA: NON REACTIVE
HGB BLD-MCNC: 12.4 G/DL (ref 11.1–15.9)
HGB UR QL STRIP: NEGATIVE
HIV 1+2 AB+HIV1 P24 AG SERPL QL IA: NON REACTIVE
IMM GRANULOCYTES # BLD AUTO: 0 X10E3/UL (ref 0–0.1)
IMM GRANULOCYTES NFR BLD AUTO: 0 %
KETONES UR QL STRIP: NEGATIVE
LAB DIRECTOR NAME PROVIDER: NORMAL
LAB DIRECTOR NAME PROVIDER: NORMAL
LABORATORY COMMENT REPORT: ABNORMAL
LABORATORY COMMENT REPORT: NORMAL
LEUKOCYTE ESTERASE UR QL STRIP: NEGATIVE
LIMITATIONS OF THE TEST: NORMAL
LYMPHOCYTES # BLD AUTO: 1.3 X10E3/UL (ref 0.7–3.1)
LYMPHOCYTES NFR BLD AUTO: 24 %
MCH RBC QN AUTO: 28.4 PG (ref 26.6–33)
MCHC RBC AUTO-ENTMCNC: 33.4 G/DL (ref 31.5–35.7)
MCV RBC AUTO: 85 FL (ref 79–97)
METHADONE UR QL SCN: NEGATIVE NG/ML
MICRO URNS: NORMAL
MICRO URNS: NORMAL
MONOCYTES # BLD AUTO: 0.4 X10E3/UL (ref 0.1–0.9)
MONOCYTES NFR BLD AUTO: 7 %
N GONORRHOEA RRNA SPEC QL NAA+PROBE: NEGATIVE
NEGATIVE PREDICTIVE VALUE: NORMAL
NEUTROPHILS # BLD AUTO: 3.6 X10E3/UL (ref 1.4–7)
NEUTROPHILS NFR BLD AUTO: 65 %
NITRITE UR QL STRIP: NEGATIVE
NOTE: NORMAL
OPIATES UR QL SCN: NEGATIVE NG/ML
OXYCODONE+OXYMORPHONE UR QL SCN: NEGATIVE NG/ML
PCP UR QL: NEGATIVE NG/ML
PERFORMANCE CHARACTERISTICS: NORMAL
PH UR STRIP: 6.5 [PH] (ref 5–7.5)
PH UR: 6.7 [PH] (ref 4.5–8.9)
PLATELET # BLD AUTO: 216 X10E3/UL (ref 150–450)
POSITIVE PREDICTIVE VALUE: NORMAL
PROPOXYPH UR QL SCN: NEGATIVE NG/ML
PROT UR QL STRIP: NEGATIVE
RBC # BLD AUTO: 4.37 X10E6/UL (ref 3.77–5.28)
RBC #/AREA URNS HPF: NORMAL /HPF (ref 0–2)
REF LAB TEST METHOD: NORMAL
RH BLD: POSITIVE
RPR SER QL: NON REACTIVE
RUBV IGG SERPL IA-ACNC: 2.37 INDEX
SP GR UR STRIP: 1.02 (ref 1–1.03)
TEST PERFORMANCE INFO SPEC: NORMAL
TRIOSOMY 16: NOT DETECTED
TRISOMY 22: NOT DETECTED
TSH SERPL DL<=0.005 MIU/L-ACNC: 5.94 UIU/ML (ref 0.45–4.5)
UROBILINOGEN UR STRIP-MCNC: 0.2 MG/DL (ref 0.2–1)
WBC # BLD AUTO: 5.5 X10E3/UL (ref 3.4–10.8)
WBC #/AREA URNS HPF: NORMAL /HPF (ref 0–5)

## 2024-07-16 ENCOUNTER — OFFICE VISIT (OUTPATIENT)
Dept: ENDOCRINOLOGY | Facility: CLINIC | Age: 24
End: 2024-07-16
Payer: MEDICAID

## 2024-07-16 VITALS
HEIGHT: 67 IN | SYSTOLIC BLOOD PRESSURE: 106 MMHG | BODY MASS INDEX: 34.21 KG/M2 | OXYGEN SATURATION: 99 % | DIASTOLIC BLOOD PRESSURE: 70 MMHG | WEIGHT: 218 LBS | HEART RATE: 89 BPM

## 2024-07-16 DIAGNOSIS — E55.9 VITAMIN D DEFICIENCY: ICD-10-CM

## 2024-07-16 DIAGNOSIS — Z3A.11 11 WEEKS GESTATION OF PREGNANCY: ICD-10-CM

## 2024-07-16 DIAGNOSIS — E03.9 ACQUIRED HYPOTHYROIDISM: Primary | ICD-10-CM

## 2024-07-16 DIAGNOSIS — E01.0 THYROMEGALY: ICD-10-CM

## 2024-07-16 PROCEDURE — 99244 OFF/OP CNSLTJ NEW/EST MOD 40: CPT | Performed by: PHYSICIAN ASSISTANT

## 2024-07-16 PROCEDURE — 1159F MED LIST DOCD IN RCRD: CPT | Performed by: PHYSICIAN ASSISTANT

## 2024-07-16 PROCEDURE — 1160F RVW MEDS BY RX/DR IN RCRD: CPT | Performed by: PHYSICIAN ASSISTANT

## 2024-07-16 RX ORDER — LEVOTHYROXINE SODIUM 0.07 MG/1
75 TABLET ORAL DAILY
Qty: 30 TABLET | Refills: 2 | Status: SHIPPED | OUTPATIENT
Start: 2024-07-16 | End: 2025-07-16

## 2024-07-16 NOTE — PROGRESS NOTES
Chief Complaint:   Onel Zapata is a 23 y.o. female who is being seen today for  Hypothyroidism . Referred by Edilia Munoz MD.     HPI     23 y.o. female with Lychen sclerosus presents for consultation regarding hypothyroidism.     She is  with hx of miscarriage in 2024.   She is currently 11 weeks gestation.   She feels tired with occasional nausea since pregnancy.   She has dry skin on elbows - chronic.   Hair falls out a lot - chronic.   Feels like her thyroid is big - chronic. No trouble swallowing or other compressive symptoms.   She was dx with hyperthyroidism when she was 7-7 yo and had treatment that caused her thyroid to be hypothyroid - I assume radioactive iodine. She was put on levothyroxine and took that for years on and off. She felt better off the medicine than on the medicine. She was put on [place] thyroid  and she took it for a 3-4 weeks but felt more tired on it and stopped taking it. She was also concerned with possible heart problems with armour she read about.   She is currently not on any medication. TSH on 7/10/24 was elevated at 5.94.     Hx vit d deficiency. Took OCT supplement in past. Vit D was 17 3/2024.     Works at a .    Paternal aunt has thyroid problems - info lacking.       The following portions of the patient's history were reviewed and updated by me as appropriate: allergies, current medications, past family history, past social history, past surgical history and problem list.    Review of Systems  Review of Systems   Constitutional:  Positive for fatigue.   Gastrointestinal:  Positive for nausea.   Skin:         Hair loss, dry skin   All other systems reviewed and are negative.       Current medications:  Current Outpatient Medications   Medication Sig Dispense Refill    cephalexin (Keflex) 500 MG capsule Take 1 capsule by mouth 3 (Three) Times a Day for 7 days. 21 capsule 0    prenatal vitamin (prenatal, CLASSIC, vitamin) tablet Take  by mouth Daily.        No current facility-administered medications for this visit.       Physical Exam   Vitals:    07/16/24 0751   BP: 106/70   Pulse: 89   SpO2: 99%   Body mass index is 34.14 kg/m².  Physical Exam  Constitutional:       General: She is not in acute distress.     Appearance: She is well-developed. She is not diaphoretic.   Eyes:      General: Lids are normal.   Neck:      Thyroid: No thyroid mass or thyromegaly.      Vascular: No JVD.      Trachea: No tracheal deviation.   Cardiovascular:      Rate and Rhythm: Normal rate and regular rhythm.   Pulmonary:      Effort: Pulmonary effort is normal.      Breath sounds: Normal breath sounds.   Musculoskeletal:         General: No tenderness.   Lymphadenopathy:      Cervical: No cervical adenopathy.   Skin:     General: Skin is warm and dry.      Findings: No erythema or rash.   Neurological:      Mental Status: She is alert and oriented to person, place, and time.   Psychiatric:         Speech: Speech normal.         Behavior: Behavior normal.         Thought Content: Thought content normal.         Labs and Imaging   Lab Results   Component Value Date    TSH 5.940 (H) 07/10/2024    THYROIDAB 393 (H) 03/06/2024           Assessment / Plan     Diagnoses and all orders for this visit:    1. Acquired hypothyroidism (Primary)  -     levothyroxine (Synthroid) 75 MCG tablet; Take 1 tablet by mouth Daily.  Dispense: 30 tablet; Refill: 2    2. 11 weeks gestation of pregnancy    3. Thyromegaly  -     US Thyroid; Future    4. Vitamin D deficiency        Problem List Items Addressed This Visit    None    Diagnosis was discussed and reviewed with the patient including the advantages of drug therapy.        Patient appears clinically hypothyroid. Recent TSH 5.9 with goal <2.5 during pregnancy. Start levothyroxine 75 mcg daily. Reviewed proper absorption. If a pill is missed she can take two the following day. We will repeat TFTs at f/u in 4 weeks. Check thyroid u/s to check for any  nodules. Discussed risks of untreated or under treated hypothyroidism during pregnancy.   Recommended resuming vitamin D supplement based on low level in March. I will repeat vit d next visit.     F/u 4 weeks      Signed by: Leo Foster PA-C  Endocrinology  07/16/2024

## 2024-07-16 NOTE — LETTER
2024     Edilia Munoz MD  [address]  [address]  [facility] [address]    Patient: Onel Zapata   YOB: 2000   Date of Visit: 2024     Dear Edilia Munoz MD:       Thank you for referring Onel Zapata to me for evaluation. Below are the relevant portions of my assessment and plan of care.    If you have questions, please do not hesitate to call me. I look forward to following Onel along with you.         Sincerely,        Leo Foster PA-C        CC: No Recipients    Leo Foster PA-C  [phone]  Sign when Signing Visit      Chief Complaint:   Onel Zapata is a 23 y.o. female who is being seen today for  Hypothyroidism . Referred by Edilia Munoz MD.     HPI     23 y.o. female with Lychen sclerosus presents for consultation regarding hypothyroidism.     She is  with hx of miscarriage in 2024.   She is currently 11 weeks gestation.   She feels tired with occasional nausea since pregnancy.   She has dry skin on elbows - chronic.   Hair falls out a lot - chronic.   Feels like her thyroid is big - chronic. No trouble swallowing or other compressive symptoms.   She was dx with hyperthyroidism when she was 7-9 yo and had treatment that caused her thyroid to be hypothyroid - I assume radioactive iodine. She was put on levothyroxine and took that for years on and off. She felt better off the medicine than on the medicine. She was put on [place] thyroid  and she took it for a 3-4 weeks but felt more tired on it and stopped taking it. She was also concerned with possible heart problems with armour she read about.   She is currently not on any medication. TSH on 7/10/24 was elevated at 5.94.     Hx vit d deficiency. Took OCT supplement in past. Vit D was 17 3/2024.     Works at a .    Paternal aunt has thyroid problems - info lacking.       The following portions of the patient's history were reviewed and updated by me as appropriate: allergies,  current medications, past family history, past social history, past surgical history and problem list.    Review of Systems  Review of Systems   Constitutional:  Positive for fatigue.   Gastrointestinal:  Positive for nausea.   Skin:         Hair loss, dry skin   All other systems reviewed and are negative.       Current medications:  Current Outpatient Medications   Medication Sig Dispense Refill   • cephalexin (Keflex) 500 MG capsule Take 1 capsule by mouth 3 (Three) Times a Day for 7 days. 21 capsule 0   • prenatal vitamin (prenatal, CLASSIC, vitamin) tablet Take  by mouth Daily.       No current facility-administered medications for this visit.       Physical Exam   Vitals:    07/16/24 0751   BP: 106/70   Pulse: 89   SpO2: 99%   Body mass index is 34.14 kg/m².  Physical Exam  Constitutional:       General: She is not in acute distress.     Appearance: She is well-developed. She is not diaphoretic.   Eyes:      General: Lids are normal.   Neck:      Thyroid: No thyroid mass or thyromegaly.      Vascular: No JVD.      Trachea: No tracheal deviation.   Cardiovascular:      Rate and Rhythm: Normal rate and regular rhythm.   Pulmonary:      Effort: Pulmonary effort is normal.      Breath sounds: Normal breath sounds.   Musculoskeletal:         General: No tenderness.   Lymphadenopathy:      Cervical: No cervical adenopathy.   Skin:     General: Skin is warm and dry.      Findings: No erythema or rash.   Neurological:      Mental Status: She is alert and oriented to person, place, and time.   Psychiatric:         Speech: Speech normal.         Behavior: Behavior normal.         Thought Content: Thought content normal.         Labs and Imaging   Lab Results   Component Value Date    TSH 5.940 (H) 07/10/2024    THYROIDAB 393 (H) 03/06/2024           Assessment / Plan     Diagnoses and all orders for this visit:    1. Acquired hypothyroidism (Primary)  -     levothyroxine (Synthroid) 75 MCG tablet; Take 1 tablet by mouth  Daily.  Dispense: 30 tablet; Refill: 2    2. 11 weeks gestation of pregnancy    3. Thyromegaly  -     US Thyroid; Future    4. Vitamin D deficiency        Problem List Items Addressed This Visit    None    Diagnosis was discussed and reviewed with the patient including the advantages of drug therapy.        Patient appears clinically hypothyroid. Recent TSH 5.9 with goal <2.5 during pregnancy. Start levothyroxine 75 mcg daily. Reviewed proper absorption. If a pill is missed she can take two the following day. We will repeat TFTs at f/u in 4 weeks. Check thyroid u/s to check for any nodules. Discussed risks of untreated or under treated hypothyroidism during pregnancy.   Recommended resuming vitamin D supplement based on low level in March. I will repeat vit d next visit.     F/u 4 weeks      Signed by: Leo Foster PA-C  Endocrinology  07/16/2024

## 2024-07-17 ENCOUNTER — REFERRAL TRIAGE (OUTPATIENT)
Dept: LABOR AND DELIVERY | Facility: HOSPITAL | Age: 24
End: 2024-07-17
Payer: MEDICAID

## 2024-07-25 ENCOUNTER — HOSPITAL ENCOUNTER (OUTPATIENT)
Dept: ULTRASOUND IMAGING | Facility: HOSPITAL | Age: 24
Discharge: HOME | End: 2024-07-25
Admitting: PHYSICIAN ASSISTANT
Payer: MEDICAID

## 2024-07-25 DIAGNOSIS — E01.0 THYROMEGALY: ICD-10-CM

## 2024-07-25 PROCEDURE — 76536 US EXAM OF HEAD AND NECK: CPT

## 2024-07-29 ENCOUNTER — PATIENT OUTREACH (OUTPATIENT)
Dept: LABOR AND DELIVERY | Facility: HOSPITAL | Age: 24
End: 2024-07-29
Payer: MEDICAID

## 2024-07-29 NOTE — OUTREACH NOTE
[facility]  Unable to Reach       Questions/Answers      Flowsheet Row Responses   Pending Outreach Confirm Patient Interest   Call Attempt First   Outcome No answer/busy, Left message   Next Call Attempt Date 08/08/24   Unable to reach comments: Will attempt contact next week.            Beba Gusman RN  Maternity Nurse Navigator    7/29/2024, 15:25 EDT

## 2024-08-07 ENCOUNTER — ROUTINE PRENATAL (OUTPATIENT)
Dept: OBSTETRICS AND GYNECOLOGY | Facility: CLINIC | Age: 24
End: 2024-08-07
Payer: MEDICAID

## 2024-08-07 VITALS — BODY MASS INDEX: 33.92 KG/M2 | DIASTOLIC BLOOD PRESSURE: 76 MMHG | WEIGHT: 216.6 LBS | SYSTOLIC BLOOD PRESSURE: 134 MMHG

## 2024-08-07 DIAGNOSIS — Z3A.14 14 WEEKS GESTATION OF PREGNANCY: ICD-10-CM

## 2024-08-07 DIAGNOSIS — E06.3 HYPOTHYROIDISM DUE TO HASHIMOTO'S THYROIDITIS: ICD-10-CM

## 2024-08-07 DIAGNOSIS — O23.40 URINARY TRACT INFECTION IN MOTHER DURING PREGNANCY, ANTEPARTUM: Primary | ICD-10-CM

## 2024-08-07 DIAGNOSIS — Z86.79 HISTORY OF CHRONIC HYPERTENSION: ICD-10-CM

## 2024-08-07 DIAGNOSIS — R82.71 GBS BACTERIURIA: ICD-10-CM

## 2024-08-07 DIAGNOSIS — Z34.90 PRENATAL CARE, ANTEPARTUM: ICD-10-CM

## 2024-08-07 DIAGNOSIS — O99.330 TOBACCO USE DURING PREGNANCY, ANTEPARTUM: ICD-10-CM

## 2024-08-07 DIAGNOSIS — E03.8 HYPOTHYROIDISM DUE TO HASHIMOTO'S THYROIDITIS: ICD-10-CM

## 2024-08-07 LAB
GLUCOSE UR STRIP-MCNC: NEGATIVE MG/DL
PROT UR STRIP-MCNC: ABNORMAL MG/DL

## 2024-08-07 NOTE — PROGRESS NOTES
OB FOLLOW UP  CC- Here for care of pregnancy        Onel Zapata is a 23 y.o.  14w3d patient being seen today for her obstetrical follow up visit. Patient reports period-like cramping. Also reports bladder pressure, urinary frequency and urgency. Denies any dysuria.      Pt has lost 8 pounds since last visit. Denies any nausea or vomiting. States she has a good appetite.   She was started on synthroid per endocrine, and has FU sched next week.       Her prenatal care is complicated by (and status) :   Patient Active Problem List   Diagnosis    Hypothyroidism due to Hashimoto's thyroiditis    Pregnancy    Tobacco use during pregnancy, antepartum    Marijuana use during pregnancy    History of chronic hypertension    GBS bacteriuria       Genetic testing?: already completed and was normal.  NOB labs reviewed. UTI at [facility], urine culture collected    Ultrasound Today: No    ROS -   Patient Denies: leaking of fluid, vaginal bleeding, dysuria, excessive vomiting, and more than 6 contractions per hour  All other systems reviewed and are negative.     The additional following portions of the patient's history were reviewed and updated as appropriate: allergies, current medications, past family history, past medical history, past social history, past surgical history, and problem list.    I have reviewed and agree with the HPI, ROS, and historical information as entered above. Edilia Munoz MD          /76   Wt 98.2 kg (216 lb 9.6 oz)   LMP 2024   BMI 33.92 kg/m²         EXAM:     Prenatal Vitals  BP: 134/76  Weight: 98.2 kg (216 lb 9.6 oz)   Fetal Heart Rate: +          Urine Glucose Read-only: Negative  Urine Protein Read-only: (!) 1+       Assessment and Plan    Problem List Items Addressed This Visit       Hypothyroidism due to Hashimoto's thyroiditis    Overview     TSH 6 prior to nob, but not taking synthroid. Refer endocrine.          Relevant Medications    levothyroxine  (Synthroid) 75 MCG tablet    Pregnancy    Overview     cfDNA low risk         Tobacco use during pregnancy, antepartum    History of chronic hypertension    Overview     Resolved after weight loss 2-3 yrs ago. Was on 2 different meds. But normal since then   Baby asa         GBS bacteriuria     Other Visit Diagnoses       Urinary tract infection in mother during pregnancy, antepartum    -  Primary    Relevant Orders    Urine Culture - Urine, Urine, Clean Catch    Prenatal care, antepartum        Relevant Orders    POC Urinalysis Dipstick (Completed)    US Ob 14 + Weeks Single or First Gestation            Pregnancy at 14w3d  Labs reviewed from New OB Visit.  Counseled on genetic testing, carrier status and option for NT screen- done and low risk.   Has repeat TFTs with endocrine next week.   Activity and Exercise discussed.  Patient is on Prenatal vitamins  Return in about 5 weeks (around 9/11/2024) for F/U Prenatal, U/S Next Visit.    Edilia Munoz MD  08/07/2024

## 2024-08-08 ENCOUNTER — PATIENT OUTREACH (OUTPATIENT)
Dept: LABOR AND DELIVERY | Facility: HOSPITAL | Age: 24
End: 2024-08-08
Payer: MEDICAID

## 2024-08-08 NOTE — OUTREACH NOTE
[facility]  Unable to Reach       Questions/Answers      Flowsheet Row Responses   Pending Outreach Confirm Patient Interest   Call Attempt Second   Outcome No answer/busy, Left message   Next Call Attempt Date 08/19/24   Unable to reach comments: Will attempt contact in a few weeks              Beba Gusman RN  Maternity Nurse Navigator    8/8/2024, 13:39 EDT

## 2024-08-09 LAB
BACTERIA UR CULT: ABNORMAL

## 2024-08-12 ENCOUNTER — TELEPHONE (OUTPATIENT)
Dept: OBSTETRICS AND GYNECOLOGY | Facility: CLINIC | Age: 24
End: 2024-08-12
Payer: MEDICAID

## 2024-08-12 ENCOUNTER — PATIENT OUTREACH (OUTPATIENT)
Dept: LABOR AND DELIVERY | Facility: HOSPITAL | Age: 24
End: 2024-08-12
Payer: MEDICAID

## 2024-08-12 RX ORDER — CEPHALEXIN 500 MG/1
500 CAPSULE ORAL 4 TIMES DAILY
Qty: 28 CAPSULE | Refills: 0 | Status: SHIPPED | OUTPATIENT
Start: 2024-08-12 | End: 2024-08-19

## 2024-08-12 NOTE — OUTREACH NOTE
[facility]  Enrollment    Current Estimated Gestational Age: 15w1d    Questions/Answers      Flowsheet Row Responses   Would like to participate? Yes   Date of Intake Visit 08/15/24            Onel returned my phone call indicating her desire to participate in the[facility] program. Intake interview planned for this week. Prenatal SDOH survey sent via [employer] and response requested.     Beba Gusman RN  Maternity Nurse Navigator    8/12/2024, 08:46 EDT

## 2024-08-12 NOTE — TELEPHONE ENCOUNTER
KS patient- 15 1/7    Patient called office following receiving +Urine culture over the weekend, requesting rx as she is symptomatic.        Reviewed with Lily alvarenga sent to pharmacy.  Attempted to call patient to review, ATTILA

## 2024-08-15 ENCOUNTER — PATIENT OUTREACH (OUTPATIENT)
Dept: LABOR AND DELIVERY | Facility: HOSPITAL | Age: 24
End: 2024-08-15
Payer: MEDICAID

## 2024-08-15 NOTE — OUTREACH NOTE
"History and Physical  [facility]  Department of Surgery     REFERRING PROVIDER: Nilda Linares Pa-c  [address] [place] [address]     CHIEF COMPLAINT: intraductal papilloma of the right breast     Subjective:       Violeta Champion is a 58 y.o. postmenopausal female referred for evaluation of an intraductal papilloma of the right breast noted on core needle biopsy.  Patient denies right nipple discharge.  Diagnostics studies including  Mammogram and/or ultrasound were performed on 2020.  Patient was given BIRADS 4 and a biopsy was scheduled. A ultrasound guided biopsy was performed on 2020.  Pathology demonstrated an intraductal papilloma.     Patient does routinely do self breast exams.  Patient has noted a change on breast exam.  Patient denies to previous breast biopsy. Patient denies a personal history of breast cancer.    Interval History 12/10/12:   Patient is here today for her excisional biopsy. No changes since we last saw her in clinic.      GYN History:  Age of menarche was 12. Age of menopause was "many years ago".  Last menstrual period was then. Patient denies hormonal therapy. Patient is .d.     Breast cancer risk factors include family hx of ovarian CA. Paternal aunt and PGM with breast cancer  Mat Cousin with br cancer x2 in 40s             Past Medical History:   Diagnosis Date    Diabetes mellitus, type 2      GERD (gastroesophageal reflux disease)      High cholesterol      Hypertension      Hypothyroid      Injury of knee, leg, ankle and foot      Insulin-resistant diabetes mellitus and acanthosis nigricans      Menopause present      Osteoarthritis      Osteoarthritis, knee      Osteopenia      Osteopenia      Sleep apnea     lost 60 lbs on trulicity           Past Surgical History:   Procedure Laterality Date    bilateral duct removal breast        BREAST CYST ASPIRATION Right     HYSTERECTOMY        OOPHORECTOMY        OTHER " Motherhood Connection  Intake    Current Estimated Gestational Age: 15w4d    Intake Assessment      Flowsheet Row Responses   Best Method for Contacting Cell   Currently Employed Yes   Able to keep appointments as scheduled Yes   Gender(s) and Name(s) Madie Chavira   Do you have a dentist? No   Resources Presently Utilizing: Medical Specialist  [Endocrinology]   Maternal Warning Signs Provided   Other Education How to find a dentist, How to find a pediatrician, Insurance benefits/Incentives, SNAP Benefits, WIC Benefits, HANDS, Housing Assistance, Smoking/Vaping Cessation            Learning Assessment      Flowsheet Row Responses   Relationship Patient   Learner Name Onel   Does the learner have any barriers to learning? No Barriers   What is the preferred language of the learner for medical teaching? English   Is an  required? No   How does the learner prefer to learn new concepts? Demonstration            Tobacco, Alcohol, and Drug History     reports that she quit smoking about 4 years ago. Her smoking use included cigarettes. She started smoking about 2 years ago. She has never been exposed to tobacco smoke. She has never used smokeless tobacco.   reports no history of alcohol use.   reports that she does not currently use drugs after having used the following drugs: Marijuana.    Motherhood Connection  Check-In    Current Estimated Gestational Age: 15w4d      Questions/Answers      Flowsheet Row Responses   Best Method for Contacting Cell   Demographics Reviewed Yes   Currently Employed Yes   Able to keep appointments as scheduled Yes   Gender(s) and Name(s) Madie Chavira   Baby Active/Feeling Fetal Movemen No, Early in Pregnancy   How are you presently feeling? pretty good but tired   Are you having any of the following symptoms? Breast Tenderness   Questions regarding prenatal visits or tests to be ordered? No   May I ask you questions about your substance use? Yes  "  Other Comment THC use stopped with pregnancy   Resource/Environmental Concerns Financial   Do you have any questions related to your care experience, your pregnancy, plans for delivery, any concerns, etc? No   Other Education How to find a dentist, How to find a pediatrician, Insurance benefits/Incentives, SNAP Benefits, WIC Benefits, HANDS, Housing Assistance, Smoking/Vaping Cessation          Onel is seeing Dr. Munoz for her prenatal care. Her history is significant for thyroid disease treated by synthroid, CHTN not on meds and Lic[place] Sclerosis.     She has experienced trauma in her life and declined to discuss today. She did share her Mother is currently incarcerated for trafficking Methamphetamines. She has no SI/HI and currently feels safe. She reports a history of self harm in the form of cutting as a teen. She has not engaged in this behavior in several years. Her PHQ-9  scored a 12 today. Dr Munoz was messaged concerning this issue. Encouraged to reach out to us or Dr Munoz's office with a desire for Behavioral Health services.   Discussed that we will be screening periodically for  and postpartum changes with mood looking for trends which may need to be addressed. VU.    She is feeling well, reports having not begun feeling fetal movement.  She is nervous about labor, birth and caring for a . She is interested in childbirth classes. Prenatal  education discussed. Discussed bonus benefits of pregnancy from insurance for potential assistance with some infant care items.     [facility] reviewed with patient.  Immediate concerns for housing and food insecurity were identified today. She and MARGARITA are living with friends who plan to move in November. Onel has been having difficulty locating housing for them since she has had a previous eviction and Geo has a felony in the past. In regards to food insecurity, they have never been without food but have \"been slim\" a few times.  Declined " SURGICAL HISTORY         bilateral central duct removal with bilateral papilomona             Current Outpatient Medications on File Prior to Visit   Medication Sig Dispense Refill    albuterol (VENTOLIN HFA) 90 mcg/actuation inhaler INHALE ONE TO TWO PUFFS INTO LUNGS EVERY 6 HOURS AS NEEDED FOR WHEEZING 54 g 3    amLODIPine (NORVASC) 5 MG tablet Take 1 tablet (5 mg total) by mouth once daily. 90 tablet 3    aspirin 81 mg Tab Take by mouth. 1 Tablet Oral Every day        blood sugar diagnostic (FREESTYLE INSULINX TEST STRIPS) Strp Check glucose three times daily 100 each 3    blood-glucose meter,continuous (DEXCOM G6 ) Misc 1 each by Misc.(Non-Drug; Combo Route) route Daily. Use as directed with Dexcom G6 transmitter and sensor 1 each 0    blood-glucose sensor (DEXCOM G6 SENSOR) [place] 1 each by Misc.(Non-Drug; Combo Route) route every 10 days. Apply/change sensor to skin every 10 days. 3 each 3    blood-glucose transmitter (DEXCOM G6 TRANSMITTER) [place] 1 each by Misc.(Non-Drug; Combo Route) route Daily. Use transmitter in sensor with G6 system. Change new transmitter every 3 months. 1 each 3    calcium carbonate (CALCIUM CARBONATE) 300 mg (750 mg) Chew Take by mouth 2 (two) times daily.          celecoxib (CELEBREX) 200 MG capsule          diclofenac sodium (VOLTAREN) 1 % Gel APPLY TWO GRAMS TOPICALLY ONCE DAILY 100 g 0    DULoxetine (CYMBALTA) 30 MG capsule Take 1 capsule (30 mg total) by mouth once daily. 30 capsule 3    empagliflozin (JARDIANCE) 10 mg tablet Take 1 tablet (10 mg total) by mouth once daily. 30 tablet 6    ezetimibe (ZETIA) 10 mg tablet Take 1 tablet (10 mg total) by mouth every evening. 90 tablet 3    fish oil-dha-epa 1,200-144-216 mg Cap Take by mouth. 1 Capsule Oral Every day        flurazepam (DALMANE) 30 MG capsule Take 30 mg by mouth nightly as needed.          fluticasone-salmeterol diskus inhaler 250-50 mcg Inhale 1 puff into the lungs 2 (two) times daily. Controller  180 each 3    folic acid (FOLVITE) 1 MG tablet Take 1 tablet (1 mg total) by mouth once daily. 90 tablet 3    furosemide (LASIX) 40 MG tablet Take 1 tablet (40 mg total) by mouth 2 (two) times daily. 60 tablet 11    gabapentin (NEURONTIN) 300 MG capsule Take 1 capsule (300 mg total) by mouth 3 (three) times daily. 90 capsule 3    glipiZIDE (GLUCOTROL) 10 MG TR24 Take 1 tablet (10 mg total) by mouth daily with breakfast. 90 tablet 1    HYDROcodone-acetaminophen (NORCO)  mg per tablet Take 1 tablet by mouth every 6 to 8 hours as needed for Pain. 90 tablet 0    hyoscyamine (LEVSIN/SL) 0.125 mg Subl DISSOLVE 1 TABLET UNDER THE TONGUE EVERY 4 TO 6 HOURS 30 tablet 5    ibuprofen (ADVIL,MOTRIN) 800 MG tablet Take 800 mg by mouth every 8 (eight) hours as needed.   0    lactulose (CHRONULAC) 10 gram/15 mL solution TAKE 15 ML BY MOUTH  ONCE DAILY AS NEEDED FOR CONSTIPATION 473 mL 6    levocetirizine (XYZAL) 5 MG tablet Take 1 tablet (5 mg total) by mouth every evening. 30 tablet 11    levothyroxine (SYNTHROID) 100 MCG tablet Take 1 tablet by mouth once daily 90 tablet 1    LORazepam (ATIVAN) 0.5 MG tablet Take 1 tablet (0.5 mg total) by mouth 2 (two) times daily. 60 tablet 0    meclizine (ANTIVERT) 25 mg tablet Take 1 tablet (25 mg total) by mouth 3 (three) times daily as needed. 30 tablet 0    metFORMIN (GLUCOPHAGE) 1000 MG tablet Take 1 tablet (1,000 mg total) by mouth 2 (two) times daily with meals. 180 tablet 3    metoprolol tartrate (LOPRESSOR) 100 MG tablet Take 1 tablet (100 mg total) by mouth 2 (two) times daily. 180 tablet 1    mv-min-FA-Dp-Kb-xhxhorm-lutein (CENTRUM) 0.4-162-18 mg Tab Take by mouth. 1 Tablet Oral Every day        omeprazole (PRILOSEC) 20 MG capsule Take 1 capsule (20 mg total) by mouth 2 (two) times daily. 60 capsule 2    ondansetron (ZOFRAN) 4 MG tablet Take 1 tablet (4 mg total) by mouth every 8 (eight) hours as needed. 30 tablet 1    potassium chloride SA (K-DUR,KLOR-CON)  use of local food [employer] today. She reports MARGARITA is happy and involved with pregnancy. MARGARITA has four other children, #1-3 in his mothers custody, he is involved in their lives, and #4 he has no contact with by preference of infants Mother.     Multiple resources provided via [employer] message. Contact information provided. Encouraged to call with questions, concerns, or for support. Will plan f/u around 20 weeks for wellness and resource needs check in.    Beba Gusman RN  Maternity Nurse Navigator    8/15/2024, 15:11 EDT                   20 MEQ tablet Take 1 tablet (20 mEq total) by mouth 2 (two) times daily. 180 tablet 3    pravastatin (PRAVACHOL) 80 MG tablet Take 1 tablet (80 mg total) by mouth once daily. 90 tablet 1    sucralfate (CARAFATE) 1 gram tablet TAKE 1 TABLET BY MOUTH THREE TIMES A DAY 90 tablet 1    temazepam (RESTORIL) 30 mg capsule Take 1 capsule (30 mg total) by mouth nightly as needed. 1 Capsule Oral Every day 30 capsule 0    tiZANidine (ZANAFLEX) 4 MG tablet Take 1 tablet (4 mg total) by mouth every 8 (eight) hours. 90 tablet 3    tolterodine (DETROL) 2 MG Tab Take 1 tablet (2 mg total) by mouth 2 (two) times daily. 60 tablet 11    TRULICITY 1.5 mg/0.5 mL PnIj INJECT 1.5 MG INTO THE SKIN EVERY 7 DAYS 4 mL 11      No current facility-administered medications on file prior to visit.       Social History               Socioeconomic History    Marital status: Single       Spouse name: Not on file    Number of children: 0    Years of education: 14    Highest education level: Not on file   Occupational History    Occupation: Nurse       Employer: [employer]   Social Needs    Financial resource strain: Not on file    Food insecurity       Worry: Not on file       Inability: Not on file    Transportation needs       Medical: Not on file       Non-medical: Not on file   Tobacco Use    Smoking status: Never Smoker    Smokeless tobacco: Never Used   Substance and Sexual Activity    Alcohol use: Yes       Alcohol/week: 0.0 standard drinks       Comment: very rarely    Drug use: No    Sexual activity: Not on file   Lifestyle    Physical activity       Days per week: Not on file       Minutes per session: Not on file    Stress: Not on file   Relationships    Social connections       Talks on phone: Not on file       Gets together: Not on file       Attends [employer] service: Not on file       Active member of club or organization: Not on file       Attends meetings of clubs or organizations: Not on  "file       Relationship status: Not on file   Other Topics Concern    Not on file   Social History Narrative     RN, 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              Family History   Problem Relation Age of Onset    Hypertension Mother      Glaucoma Mother      Diabetes Mother      Hypertension Brother      Cancer Maternal Grandmother           breast    Amblyopia Father      Diabetes Maternal Aunt      Diabetes Maternal Uncle      Breast cancer Paternal Aunt      Breast cancer Paternal Grandmother      Blindness Neg Hx      Cataracts Neg Hx      Macular degeneration Neg Hx      Retinal detachment Neg Hx      Strabismus Neg Hx      Colon cancer Neg Hx      Stomach cancer Neg Hx      Esophageal cancer Neg Hx      Irritable bowel syndrome Neg Hx      Rectal cancer Neg Hx      Ulcerative colitis Neg Hx      Crohn's disease Neg Hx      Celiac disease Neg Hx           Review of Systems  Pertinent items are noted in HPI.     She does have knee pain and urinary frequency     Objective:      /68 (BP Location: Right arm, Patient Position: Sitting, BP Method: Large (Automatic))   Pulse 62   Ht 5' 4" (1.626 m)   Wt 135.6 kg (299 lb)   LMP  (LMP Unknown)   BMI 51.32 kg/m²         General Appearance:    Alert, cooperative, no distress, appears stated age   Head:    Normocephalic, without obvious abnormality, atraumatic   Eyes:    PERRL, lids normal   Neck:   Supple, symmetrical, no adenopathy   Lungs:     respirations unlabored; no obvious deformity   Chest Wall:    No tenderness or deformity   Heart::   Regular rate and rhythm   Abdomen:     Soft, non-tender, nondistended   Extremities:   Extremities normal, atraumatic   Skin:   Skin color, texture, turgor normal, no rashes or lesions   Lymph nodes:   No Cervical or supraclavicular adenopathy   Neuro/Psych:   Alert and oriented, good judgement   BREAST exam:  Left: no masses, skin changes. No nipple discharge or inverted nipple.  No axillary LAD  Right: mass " at 11OC 5cfn, superficial measuring 2 cm, no skin changes. No nipple discharge or inverted nipple.  No axillary LAD        Radiology review: Images personally reviewed by me in the clinic.  Mammogram: This procedure was performed using tomosynthesis. Computer-aided detection was utilized in the interpretation of this examination.  Mammo Digital Diagnostic Right w/ Demian  The right breast is almost entirely fatty.     In the right breast upper outer quadrant anterior depth, there is an irregular mass surrounded by several dilated ducts.  This corresponds to the area of palpable concern in the right breast, and is similar in appearance to mammogram May 19, 2020.     Limited right breast ultrasound:  In the right breast 11:00 o'clock axis 5 cm from the nipple, there is an irregular complex mixed cystic and solid mass measuring approximately 2.5 cm.  Associated adjacent dilated ducts.  These correspond to the mammographic finding and area of clinical concern.  No associated hypervascularity.  This finding is similar in appearance to the ultrasound May 19, 2020.      No right axillary adenopathy.      Impression:  Since cyst aspiration May 29, 2020, recurrent right breast 11:00 o'clock axis complex mixed cystic and solid mass. BI-RADS 4: Suspicious. Recommend ultrasound-guided biopsy.      BI-RADS Category:   Overall: 4 - Suspicious     Recommendation:  Ultrasound guided biopsy for the right breast.     The findings and recommendations were discussed with the patient by Dr. ANIBAL Greene in person at the time of interpretation.     Your estimated lifetime risk of breast cancer (to age 85) based on Tyrer-Cuzick risk assessment model is Tyrer-Cuzick: 12.55 %. According to the American Cancer Society, patients with a lifetime breast cancer risk of 20% or higher might benefit from supplemental screening tests.        PATHOLOGY:     BREAST, RIGHT, 11:00 5 CM FROM NIPPLE, MASS, ULTRASOUND-GUIDED BIOPSY:  - Hyalinized intraductal  "papilloma with associated duct ectasia and extensive foreign body giant cell reaction  of surrounding tissues, see comment.  - Microcalcifications: Not identified.  - Negative for atypia or malignancy.  Patient MRN as documented on container and clinic/AP label: [id number]  1. The specimen is received in formalin, labeled "right breast ", and consists of multiple whole and  fragmented white-tan to tan-yellow fibrofatty soft tissue cores which are filtered and measure 2.0 x 1.5 x 0.3  cm in aggregate. Specimen is submitted entirely in cassette   Comments  Extensive foreign body type giant cell reaction is noted within the stroma outside of the hyalinized  intraductal papilloma. These findings are likely secondary to duct rupture; however, fat necrosis or response  to other iatrogenic materials cannot be entirely excluded. Correlation with clinical findings is recommended.        Assessment:       Violeta Champion is a 58 y.o. postmenopausal female with an intraductal papilloma of the right breast     Plan:   We discussed the options for management of intraductal papilloma. We discussed with papilloma, there is not an increase in the risk of breast cancer.  If there is nipple discharge, majority (90%) it is due to the papilloma.  We will perform a duct excision at the same time to treat and stop the discharge.     Given the papilloma, I do recommend excision of the area due to the chance of upstaging to an early stage breast cancer such as DCIS or invasive breast cancer.  We did discuss that the chance of upstaging is probably about 5-10% with a papilloma.  Surgery would involve excising a small area of tissue at the site of the biopsy.  This allows us to better evaluate the tissue. This is done using a wire to localize the site the morning of surgery acting as a map for what needs to be removed.  Patient has elected to proceed with right ultrasound localized excisional breast biopsy.      Surgery will be coordinated with " the patient's schedule.  Risks and benefits were explained including but not limited to bleeding, infection, pain, recurrence, and need for further surgery.     30 minutes were spent on this encounter, 20 of which was face to face counseling and 10 minutes were spent on chart review and coordination of care.

## 2024-08-16 ENCOUNTER — OFFICE VISIT (OUTPATIENT)
Dept: ENDOCRINOLOGY | Facility: CLINIC | Age: 24
End: 2024-08-16
Payer: MEDICAID

## 2024-08-16 VITALS
OXYGEN SATURATION: 97 % | HEIGHT: 67 IN | WEIGHT: 216 LBS | DIASTOLIC BLOOD PRESSURE: 78 MMHG | SYSTOLIC BLOOD PRESSURE: 120 MMHG | HEART RATE: 80 BPM | BODY MASS INDEX: 33.9 KG/M2

## 2024-08-16 DIAGNOSIS — Z3A.15 15 WEEKS GESTATION OF PREGNANCY: ICD-10-CM

## 2024-08-16 DIAGNOSIS — E55.9 VITAMIN D DEFICIENCY: ICD-10-CM

## 2024-08-16 DIAGNOSIS — E03.9 ACQUIRED HYPOTHYROIDISM: Primary | Chronic | ICD-10-CM

## 2024-08-16 PROCEDURE — 84439 ASSAY OF FREE THYROXINE: CPT | Performed by: PHYSICIAN ASSISTANT

## 2024-08-16 PROCEDURE — 82306 VITAMIN D 25 HYDROXY: CPT | Performed by: PHYSICIAN ASSISTANT

## 2024-08-16 PROCEDURE — 84443 ASSAY THYROID STIM HORMONE: CPT | Performed by: PHYSICIAN ASSISTANT

## 2024-08-16 NOTE — PROGRESS NOTES
Chief Complaint:   Onel Zapata is a 23 y.o. female who is being seen today for  Hypothyroidism .     HPI     23 y.o. female with [place] sclerosus presents for f/u of hypothyroidism.     She is  with hx of miscarriage in 2024.   She is currently 15 weeks gestation.     Levothyroxine has historically caused her to be tired. Compred to last visit she feels more tired after starting back on levothyroxine. Can fall asleep few min after sitting down.   Eating more fruits and vegetables. No routine change otherwise.   15 week gestation.  Now on OTC vit d3 supplement  Taking levothyroxine correctly - 75 mcg  Takes prenatal vitamin several hours later    The following portions of the patient's history were reviewed and updated by me as appropriate: allergies, current medications, past family history, past social history, past surgical history and problem list.    Review of Systems  Review of Systems   Constitutional:  Positive for fatigue.   All other systems reviewed and are negative.       Current medications:  Current Outpatient Medications   Medication Sig Dispense Refill    cephalexin (Keflex) 500 MG capsule Take 1 capsule by mouth 4 (Four) Times a Day for 7 days. 28 capsule 0    levothyroxine (Synthroid) 75 MCG tablet Take 1 tablet by mouth Daily. 30 tablet 2    prenatal vitamin (prenatal, CLASSIC, vitamin) tablet Take  by mouth Daily.       No current facility-administered medications for this visit.       Physical Exam   Vitals:    24 1433   BP: 120/78   Pulse: 80   SpO2: 97%   Body mass index is 33.83 kg/m².  Physical Exam  Constitutional:       General: She is not in acute distress.     Appearance: She is well-developed. She is not diaphoretic.   Eyes:      General: Lids are normal.   Neck:      Thyroid: No thyroid mass or thyromegaly.      Vascular: No JVD.      Trachea: No tracheal deviation.   Cardiovascular:      Rate and Rhythm: Normal rate and regular rhythm.   Pulmonary:       Effort: Pulmonary effort is normal.      Breath sounds: Normal breath sounds.   Musculoskeletal:         General: No tenderness.   Lymphadenopathy:      Cervical: No cervical adenopathy.   Skin:     General: Skin is warm and dry.      Findings: No erythema or rash.   Neurological:      Mental Status: She is alert and oriented to person, place, and time.   Psychiatric:         Speech: Speech normal.         Behavior: Behavior normal.         Thought Content: Thought content normal.         Labs and Imaging   Lab Results   Component Value Date    TSH 5.940 (H) 07/10/2024    THYROIDAB 393 (H) 03/06/2024           Assessment / Plan     Diagnoses and all orders for this visit:    1. Acquired hypothyroidism (Primary)  -     TSH  -     T4, Free  -     T4    2. 15 weeks gestation of pregnancy    3. Vitamin D deficiency  -     Vitamin D,25-Hydroxy          Problem List Items Addressed This Visit       Pregnancy    Overview     cfDNA low risk          Other Visit Diagnoses       Acquired hypothyroidism  (Chronic)   -  Primary    Relevant Orders    TSH    T4, Free    T4    Vitamin D deficiency        Relevant Orders    Vitamin D,25-Hydroxy          Diagnosis was discussed and reviewed with the patient including the advantages of drug therapy.        Patient appears clinically hypothyroid. Repeat TFTs, keeping in mind she has been on levothyroxine for 4 weeks only - looking for T4 upper limit of normal. Gave sample of Tirosint 75 mcg to see if she feels better on it vs levothyroxine.   Repeat vit d though it will likely take several months to normalize.     F/u 8 weeks    Signed by: Leo Foster PA-C  Endocrinology

## 2024-08-17 LAB
25(OH)D3 SERPL-MCNC: 31.5 NG/ML (ref 30–100)
T4 FREE SERPL-MCNC: 1.13 NG/DL (ref 0.92–1.68)
T4 SERPL-MCNC: 11.2 MCG/DL (ref 4.5–11.7)
TSH SERPL DL<=0.05 MIU/L-ACNC: 2.75 UIU/ML (ref 0.27–4.2)

## 2024-08-22 ENCOUNTER — TELEPHONE (OUTPATIENT)
Dept: OBSTETRICS AND GYNECOLOGY | Facility: CLINIC | Age: 24
End: 2024-08-22

## 2024-08-22 RX ORDER — PROMETHAZINE HYDROCHLORIDE 25 MG/1
25 SUPPOSITORY RECTAL EVERY 6 HOURS PRN
Qty: 12 SUPPOSITORY | Refills: 1 | Status: ON HOLD | OUTPATIENT
Start: 2024-08-22 | End: 2025-08-22

## 2024-08-26 ENCOUNTER — HOSPITAL ENCOUNTER (OUTPATIENT)
Facility: HOSPITAL | Age: 24
Discharge: HOME | End: 2024-08-27
Attending: OBSTETRICS & GYNECOLOGY | Admitting: OBSTETRICS & GYNECOLOGY
Payer: MEDICAID

## 2024-08-26 PROBLEM — O21.0 HYPEREMESIS GRAVIDARUM: Status: ACTIVE | Noted: 2024-08-26

## 2024-08-26 LAB
ALBUMIN SERPL-MCNC: 4 G/DL (ref 3.5–5.2)
ALBUMIN/GLOB SERPL: 1.4 G/DL
ALP SERPL-CCNC: 47 U/L (ref 39–117)
ALT SERPL W P-5'-P-CCNC: 18 U/L (ref 1–33)
AMYLASE SERPL-CCNC: 52 U/L (ref 28–100)
ANION GAP SERPL CALCULATED.3IONS-SCNC: 15 MMOL/L (ref 5–15)
AST SERPL-CCNC: 15 U/L (ref 1–32)
BACTERIA UR QL AUTO: ABNORMAL /HPF
BASOPHILS # BLD AUTO: 0.02 10*3/MM3 (ref 0–0.2)
BASOPHILS NFR BLD AUTO: 0.3 % (ref 0–1.5)
BILIRUB SERPL-MCNC: 0.6 MG/DL (ref 0–1.2)
BILIRUB UR QL STRIP: NEGATIVE
BUN SERPL-MCNC: 6 MG/DL (ref 6–20)
BUN/CREAT SERPL: 12.8 (ref 7–25)
CALCIUM SPEC-SCNC: 9.3 MG/DL (ref 8.6–10.5)
CHLORIDE SERPL-SCNC: 97 MMOL/L (ref 98–107)
CLARITY UR: ABNORMAL
CO2 SERPL-SCNC: 22 MMOL/L (ref 22–29)
COLOR UR: ABNORMAL
CREAT SERPL-MCNC: 0.47 MG/DL (ref 0.57–1)
DEPRECATED RDW RBC AUTO: 38.6 FL (ref 37–54)
EGFRCR SERPLBLD CKD-EPI 2021: 138.2 ML/MIN/1.73
EOSINOPHIL # BLD AUTO: 0.02 10*3/MM3 (ref 0–0.4)
EOSINOPHIL NFR BLD AUTO: 0.3 % (ref 0.3–6.2)
ERYTHROCYTE [DISTWIDTH] IN BLOOD BY AUTOMATED COUNT: 12.9 % (ref 12.3–15.4)
FLUAV SUBTYP SPEC NAA+PROBE: NOT DETECTED
FLUBV RNA ISLT QL NAA+PROBE: NOT DETECTED
GLOBULIN UR ELPH-MCNC: 2.9 GM/DL
GLUCOSE SERPL-MCNC: 80 MG/DL (ref 65–99)
GLUCOSE UR STRIP-MCNC: NEGATIVE MG/DL
HCT VFR BLD AUTO: 35.9 % (ref 34–46.6)
HGB BLD-MCNC: 12.8 G/DL (ref 12–15.9)
HGB UR QL STRIP.AUTO: NEGATIVE
HYALINE CASTS UR QL AUTO: ABNORMAL /LPF
IMM GRANULOCYTES # BLD AUTO: 0.03 10*3/MM3 (ref 0–0.05)
IMM GRANULOCYTES NFR BLD AUTO: 0.4 % (ref 0–0.5)
KETONES UR QL STRIP: ABNORMAL
LEUKOCYTE ESTERASE UR QL STRIP.AUTO: NEGATIVE
LIPASE SERPL-CCNC: 19 U/L (ref 13–60)
LYMPHOCYTES # BLD AUTO: 1.34 10*3/MM3 (ref 0.7–3.1)
LYMPHOCYTES NFR BLD AUTO: 17 % (ref 19.6–45.3)
MCH RBC QN AUTO: 29.4 PG (ref 26.6–33)
MCHC RBC AUTO-ENTMCNC: 35.7 G/DL (ref 31.5–35.7)
MCV RBC AUTO: 82.5 FL (ref 79–97)
MONOCYTES # BLD AUTO: 0.61 10*3/MM3 (ref 0.1–0.9)
MONOCYTES NFR BLD AUTO: 7.8 % (ref 5–12)
MUCOUS THREADS URNS QL MICRO: ABNORMAL /HPF
NEUTROPHILS NFR BLD AUTO: 5.85 10*3/MM3 (ref 1.7–7)
NEUTROPHILS NFR BLD AUTO: 74.2 % (ref 42.7–76)
NITRITE UR QL STRIP: NEGATIVE
NRBC BLD AUTO-RTO: 0 /100 WBC (ref 0–0.2)
PH UR STRIP.AUTO: 6 [PH] (ref 5–8)
PLATELET # BLD AUTO: 214 10*3/MM3 (ref 140–450)
PMV BLD AUTO: 10.1 FL (ref 6–12)
POTASSIUM SERPL-SCNC: 3.1 MMOL/L (ref 3.5–5.2)
PROT SERPL-MCNC: 6.9 G/DL (ref 6–8.5)
PROT UR QL STRIP: ABNORMAL
RBC # BLD AUTO: 4.35 10*6/MM3 (ref 3.77–5.28)
RBC # UR STRIP: ABNORMAL /HPF
REF LAB TEST METHOD: ABNORMAL
SARS-COV-2 RNA RESP QL NAA+PROBE: NOT DETECTED
SODIUM SERPL-SCNC: 134 MMOL/L (ref 136–145)
SP GR UR STRIP: 1.04 (ref 1–1.03)
SQUAMOUS #/AREA URNS HPF: ABNORMAL /HPF
TRANS CELLS #/AREA URNS HPF: ABNORMAL /HPF
UROBILINOGEN UR QL STRIP: ABNORMAL
WBC # UR STRIP: ABNORMAL /HPF
WBC NRBC COR # BLD AUTO: 7.87 10*3/MM3 (ref 3.4–10.8)

## 2024-08-26 PROCEDURE — 25010000002 THIAMINE PER 100 MG: Performed by: OBSTETRICS & GYNECOLOGY

## 2024-08-26 PROCEDURE — 25810000003 DEXTROSE 5% IN LACTATED RINGERS PER 1000 ML: Performed by: OBSTETRICS & GYNECOLOGY

## 2024-08-26 PROCEDURE — 25010000002 METOCLOPRAMIDE PER 10 MG: Performed by: OBSTETRICS & GYNECOLOGY

## 2024-08-26 PROCEDURE — 82150 ASSAY OF AMYLASE: CPT | Performed by: OBSTETRICS & GYNECOLOGY

## 2024-08-26 PROCEDURE — G0463 HOSPITAL OUTPT CLINIC VISIT: HCPCS

## 2024-08-26 PROCEDURE — 25010000002 PYRIDOXINE PER 100 MG: Performed by: OBSTETRICS & GYNECOLOGY

## 2024-08-26 PROCEDURE — 36415 COLL VENOUS BLD VENIPUNCTURE: CPT | Performed by: OBSTETRICS & GYNECOLOGY

## 2024-08-26 PROCEDURE — 96375 TX/PRO/DX INJ NEW DRUG ADDON: CPT

## 2024-08-26 PROCEDURE — 81001 URINALYSIS AUTO W/SCOPE: CPT | Performed by: OBSTETRICS & GYNECOLOGY

## 2024-08-26 PROCEDURE — 80053 COMPREHEN METABOLIC PANEL: CPT | Performed by: OBSTETRICS & GYNECOLOGY

## 2024-08-26 PROCEDURE — 25010000002 ONDANSETRON PER 1 MG: Performed by: OBSTETRICS & GYNECOLOGY

## 2024-08-26 PROCEDURE — 25010000002 MAGNESIUM SULFATE PER 500 MG OF MAGNESIUM: Performed by: OBSTETRICS & GYNECOLOGY

## 2024-08-26 PROCEDURE — 99222 1ST HOSP IP/OBS MODERATE 55: CPT | Performed by: OBSTETRICS & GYNECOLOGY

## 2024-08-26 PROCEDURE — 96376 TX/PRO/DX INJ SAME DRUG ADON: CPT

## 2024-08-26 PROCEDURE — 85025 COMPLETE CBC W/AUTO DIFF WBC: CPT | Performed by: OBSTETRICS & GYNECOLOGY

## 2024-08-26 PROCEDURE — 96365 THER/PROPH/DIAG IV INF INIT: CPT

## 2024-08-26 PROCEDURE — G0480 DRUG TEST DEF 1-7 CLASSES: HCPCS | Performed by: OBSTETRICS & GYNECOLOGY

## 2024-08-26 PROCEDURE — 96361 HYDRATE IV INFUSION ADD-ON: CPT

## 2024-08-26 PROCEDURE — 96374 THER/PROPH/DIAG INJ IV PUSH: CPT

## 2024-08-26 PROCEDURE — 80307 DRUG TEST PRSMV CHEM ANLYZR: CPT | Performed by: OBSTETRICS & GYNECOLOGY

## 2024-08-26 PROCEDURE — 96366 THER/PROPH/DIAG IV INF ADDON: CPT

## 2024-08-26 PROCEDURE — 87636 SARSCOV2 & INF A&B AMP PRB: CPT | Performed by: OBSTETRICS & GYNECOLOGY

## 2024-08-26 PROCEDURE — 25010000002 DIPHENHYDRAMINE PER 50 MG: Performed by: OBSTETRICS & GYNECOLOGY

## 2024-08-26 PROCEDURE — 83690 ASSAY OF LIPASE: CPT | Performed by: OBSTETRICS & GYNECOLOGY

## 2024-08-26 PROCEDURE — 25010000002 POTASSIUM CHLORIDE PER 2 MEQ OF POTASSIUM: Performed by: OBSTETRICS & GYNECOLOGY

## 2024-08-26 RX ORDER — SODIUM CHLORIDE 0.9 % (FLUSH) 0.9 %
10 SYRINGE (ML) INJECTION AS NEEDED
Status: DISCONTINUED | OUTPATIENT
Start: 2024-08-26 | End: 2024-08-27 | Stop reason: HOSPADM

## 2024-08-26 RX ORDER — DIPHENHYDRAMINE HYDROCHLORIDE 50 MG/ML
25 INJECTION INTRAMUSCULAR; INTRAVENOUS EVERY 6 HOURS
Status: DISCONTINUED | OUTPATIENT
Start: 2024-08-26 | End: 2024-08-27 | Stop reason: HOSPADM

## 2024-08-26 RX ORDER — METOCLOPRAMIDE HYDROCHLORIDE 5 MG/ML
10 INJECTION INTRAMUSCULAR; INTRAVENOUS EVERY 6 HOURS
Status: DISCONTINUED | OUTPATIENT
Start: 2024-08-26 | End: 2024-08-27 | Stop reason: HOSPADM

## 2024-08-26 RX ORDER — FAMOTIDINE 10 MG/ML
20 INJECTION, SOLUTION INTRAVENOUS EVERY 12 HOURS SCHEDULED
Status: DISCONTINUED | OUTPATIENT
Start: 2024-08-26 | End: 2024-08-27 | Stop reason: HOSPADM

## 2024-08-26 RX ORDER — ONDANSETRON 2 MG/ML
4 INJECTION INTRAMUSCULAR; INTRAVENOUS EVERY 6 HOURS PRN
Status: DISCONTINUED | OUTPATIENT
Start: 2024-08-26 | End: 2024-08-27 | Stop reason: HOSPADM

## 2024-08-26 RX ORDER — HYDROXYZINE HYDROCHLORIDE 50 MG/ML
50 INJECTION, SOLUTION INTRAMUSCULAR EVERY 4 HOURS PRN
Status: DISCONTINUED | OUTPATIENT
Start: 2024-08-26 | End: 2024-08-27 | Stop reason: HOSPADM

## 2024-08-26 RX ORDER — LIDOCAINE HYDROCHLORIDE 10 MG/ML
0.5 INJECTION, SOLUTION EPIDURAL; INFILTRATION; INTRACAUDAL; PERINEURAL ONCE AS NEEDED
Status: DISCONTINUED | OUTPATIENT
Start: 2024-08-26 | End: 2024-08-27 | Stop reason: HOSPADM

## 2024-08-26 RX ORDER — DEXTROSE, SODIUM CHLORIDE, SODIUM LACTATE, POTASSIUM CHLORIDE, AND CALCIUM CHLORIDE 5; .6; .31; .03; .02 G/100ML; G/100ML; G/100ML; G/100ML; G/100ML
125 INJECTION, SOLUTION INTRAVENOUS CONTINUOUS
Status: DISCONTINUED | OUTPATIENT
Start: 2024-08-26 | End: 2024-08-27 | Stop reason: HOSPADM

## 2024-08-26 RX ORDER — SODIUM CHLORIDE 0.9 % (FLUSH) 0.9 %
10 SYRINGE (ML) INJECTION EVERY 12 HOURS SCHEDULED
Status: DISCONTINUED | OUTPATIENT
Start: 2024-08-26 | End: 2024-08-27 | Stop reason: HOSPADM

## 2024-08-26 RX ORDER — SODIUM CHLORIDE 9 MG/ML
40 INJECTION, SOLUTION INTRAVENOUS AS NEEDED
Status: DISCONTINUED | OUTPATIENT
Start: 2024-08-26 | End: 2024-08-27 | Stop reason: HOSPADM

## 2024-08-26 RX ADMIN — SODIUM CHLORIDE, SODIUM LACTATE, POTASSIUM CHLORIDE, CALCIUM CHLORIDE AND DEXTROSE MONOHYDRATE 125 ML/HR: 5; 600; 310; 30; 20 INJECTION, SOLUTION INTRAVENOUS at 14:29

## 2024-08-26 RX ADMIN — FAMOTIDINE 20 MG: 10 INJECTION, SOLUTION INTRAVENOUS at 21:58

## 2024-08-26 RX ADMIN — METOCLOPRAMIDE 10 MG: 5 INJECTION, SOLUTION INTRAMUSCULAR; INTRAVENOUS at 23:45

## 2024-08-26 RX ADMIN — ONDANSETRON 4 MG: 2 INJECTION INTRAMUSCULAR; INTRAVENOUS at 17:07

## 2024-08-26 RX ADMIN — DIPHENHYDRAMINE HYDROCHLORIDE 25 MG: 50 INJECTION INTRAMUSCULAR; INTRAVENOUS at 17:18

## 2024-08-26 RX ADMIN — METOCLOPRAMIDE 10 MG: 5 INJECTION, SOLUTION INTRAMUSCULAR; INTRAVENOUS at 17:07

## 2024-08-26 RX ADMIN — DIPHENHYDRAMINE HYDROCHLORIDE 25 MG: 50 INJECTION INTRAMUSCULAR; INTRAVENOUS at 23:45

## 2024-08-26 RX ADMIN — POTASSIUM CHLORIDE: 2 INJECTION, SOLUTION, CONCENTRATE INTRAVENOUS at 19:39

## 2024-08-26 NOTE — LETTER
August 27, 2024     Patient: Onel Zapata   YOB: 2000   Date of Visit: 8/26/2024       To Whom It May Concern:    It is my medical opinion that Onel Zapata may return to work on 08/29/2024 .  She was seen in our hospital on 8/26 and 8/27.          Sincerely,

## 2024-08-26 NOTE — H&P
"[facility]  Obstetric History and Physical    Chief Complaint   Patient presents with    Vomiting       HPI:      Patient is a 23 y.o. female  currently at 17w1d, who presents with nausea and vomiting for the last 6 days.  She say she only feels okay when she is in the bathtub.   She denies fever and chills.   No diarrhea.  She does use THC, but says she has not felt well enough to use it or her vape.   She received IV hydration, but still did not feel well enough to leave despite not actively vomiting since her admission.         The following portions of the patients history were reviewed and updated as appropriate: current medications, allergies, past medical history, past surgical history, past family history, past social history and problem list .       Prenatal Information:   Maternal Prenatal Labs  Blood Type No results found for: \"ABO\"   Rh Status No results found for: \"RH\"   Antibody Screen No results found for: \"ABSCRN\"   Gonnorhea No results found for: \"GCCX\"   Chlamydia No results found for: \"CLAMYDCU\"   RPR No results found for: \"RPR\"   Syphilis Antibody No results found for: \"SYPHILIS\"   Rubella No results found for: \"RUBELLAIGGIN\"   Hepatitis B Surface Antigen No results found for: \"HEPBSAG\"   HIV-1 Antibody No results found for: \"LABHIV1\"   Hepatitis C Antibody No results found for: \"HEPCAB\"   Rapid Urin Drug Screen No results found for: \"AMPMETHU\", \"BARBITSCNUR\", \"LABBENZSCN\", \"LABMETHSCN\", \"LABOPIASCN\", \"THCURSCR\", \"COCAINEUR\", \"AMPHETSCREEN\", \"PROPOXSCN\", \"BUPRENORSCNU\", \"METAMPSCNUR\", \"OXYCODONESCN\", \"TRICYCLICSCN\"   Group B Strep Culture No results found for: \"GBSANTIGEN\"           External Prenatal Results       Pregnancy Outside Results - Transcribed From Office Records - See Scanned Records For Details       Test Value Date Time    ABO  A  07/10/24 1445    Rh  Positive  07/10/24 1445    Antibody Screen  Negative  07/10/24 1445    Varicella IgG       Rubella  2.37 index 07/10/24 144 "    Hgb  12.8 g/dL 24 1422       12.4 g/dL 07/10/24 1445    Hct  35.9 % 24 1422       37.1 % 07/10/24 1445    HgB A1c        1h GTT       3h GTT Fasting       3h GTT 1 hour       3h GTT 2 hour       3h GTT 3 hour        Gonorrhea (discrete)  Negative  07/10/24 1445    Chlamydia (discrete)  Negative  07/10/24 1445    RPR  Non Reactive  07/10/24 1445    Syphils cascade: TP-Ab (FTA)       TP-Ab       TP-Ab (EIA)       TPPA       HBsAg  Negative  07/10/24 1445    Herpes Simplex Virus PCR       Herpes Simplex VIrus Culture       HIV  Non Reactive  07/10/24 1445    Hep C RNA Quant PCR       Hep C Antibody  Non Reactive  07/10/24 1445    AFP       NIPT       Cystic Fibroisis        Group B Strep       GBS Susceptibility to Clindamycin       GBS Susceptibility to Erythromycin       Fetal Fibronectin       Genetic Testing, Maternal Blood                 Drug Screening       Test Value Date Time    Urine Drug Screen       Amphetamine Screen  Negative ng/mL 07/10/24 1445    Barbiturate Screen  Negative ng/mL 07/10/24 1445    Benzodiazepine Screen  Negative ng/mL 07/10/24 1445    Methadone Screen  Negative ng/mL 07/10/24 1445    Phencyclidine Screen  Negative ng/mL 07/10/24 1445    Opiates Screen       THC Screen       Cocaine Screen       Propoxyphene Screen  Negative ng/mL 07/10/24 1445    Buprenorphine Screen       Methamphetamine Screen       Oxycodone Screen       Tricyclic Antidepressants Screen                 Legend    ^: Historical                              Past OB History:     OB History    Para Term  AB Living   2 0 0 0 1 0   SAB IAB Ectopic Molar Multiple Live Births   1 0 0 0 0 0      # Outcome Date GA Lbr Mukund/2nd Weight Sex Type Anes PTL Lv   2 Current            1 SAB 2024 5w6d              Past Medical History: Past Medical History:   Diagnosis Date    Arthritis     Graves disease     Can’t exactly remember.    Hashimoto's thyroiditis     Can’t exactly remember.    Hypertension      Hyperthyroidism     Hypothyroidism     Kidney stone     Lichen sclerosus     Ovarian cyst       Past Surgical History Past Surgical History:   Procedure Laterality Date    KNEE SURGERY Bilateral     bilateral ACL    LAPAROSCOPIC CHOLECYSTECTOMY  2021    TONSILLECTOMY        Family History: Family History   Problem Relation Age of Onset    Hypertension Mother     Arthritis Mother     Hypertension Father     Mental illness Father     Diabetes Father     Diabetes Other     Cancer Other     Diabetes Paternal Grandmother     Thyroid disease Paternal Aunt         Dads side      Social History:  reports that she quit smoking about 4 years ago. Her smoking use included cigarettes. She started smoking about 2 years ago. She has never been exposed to tobacco smoke. She has never used smokeless tobacco.   reports no history of alcohol use.   reports that she does not currently use drugs after having used the following drugs: Marijuana.        Review of Systems  Denies HA, CP, Shortness of air, muscle weakness,                                             and rashes      Objective     Vital Signs Range for the last 24 hours  Temperature: Temp:  [99 °F (37.2 °C)] 99 °F (37.2 °C)   Temp Source: Temp src: Oral   BP: BP: (135)/(85) 135/85   Pulse: Heart Rate:  [59] 59   Respirations: Resp:  [18] 18   SPO2: SpO2:  [96 %] 96 %   O2 Amount (l/min):     O2 Devices Device (Oxygen Therapy): room air   Weight: Weight:  [96.2 kg (212 lb)] 96.2 kg (212 lb)     Physical Examination: General appearance - alert, ill appearing, but no acute distress.   Chest - breathing is unlaboured   Heart - Regular rate   Abdomen - soft, nontender, nondistended,   No guarding, No RUQ pain  Extremities - pedal edema  - none       Fetal Heart Rate Assessment   Method: Fetal HR Assessment Method: intermittent auscultation, using Doppler   Beats/min: Fetal HR (beats/min): 155   Baseline:     Varibility:     Accels:     Decels:     Tracing Category:       Uterine  "Assessment   Method:     Frequency (min):  None    Ctx Count in 10 min:     Duration:     Intensity:     Intensity by IUPC:     Resting Tone:               Laboratory Results:   Lab Results   Component Value Date     08/26/2024    HGB 12.8 08/26/2024    HCT 35.9 08/26/2024    WBC 7.87 08/26/2024      Lab Results   Component Value Date     (L) 08/26/2024    K 3.1 (L) 08/26/2024    CL 97 (L) 08/26/2024    CO2 22.0 08/26/2024    BUN 6 08/26/2024    CREATININE 0.47 (L) 08/26/2024    GLUCOSE 80 08/26/2024    ALBUMIN 4.0 08/26/2024    CALCIUM 9.3 08/26/2024    AST 15 08/26/2024    ALT 18 08/26/2024    BILITOT 0.6 08/26/2024      Lab Results   Component Value Date    AMYLASE 52 08/26/2024    LIPASE 19 08/26/2024      Lab Results   Component Value Date    SQUAMEPIUA 3-6 (A) 08/26/2024    SPECGRAVUR 1.038 (H) 08/26/2024    KETONESU >=160 mg/dL (4+) (A) 08/26/2024    BLOODU Negative 08/26/2024    LEUKOCYTESUR Negative 08/26/2024    NITRITEU Negative 08/26/2024    RBCUA 0-2 08/26/2024    WBCUA 3-5 (A) 08/26/2024    BACTERIA Trace 08/26/2024              Assessment:  1. Intrauterine pregnancy at 17w1d weeks gestation  2. Hyper emesis  - unclear if \"morning sickness versus hyperemesis cannabis   3.  Hypokalemia       Plan:  1. Admit to APU  2. IV hydration   3.  Antiemetics  4.  Replace K+  5.  LORENA Vallejo MD  8/26/2024  16:33 EDT  "

## 2024-08-27 VITALS
RESPIRATION RATE: 16 BRPM | BODY MASS INDEX: 33.27 KG/M2 | DIASTOLIC BLOOD PRESSURE: 81 MMHG | HEIGHT: 67 IN | HEART RATE: 70 BPM | OXYGEN SATURATION: 96 % | SYSTOLIC BLOOD PRESSURE: 132 MMHG | WEIGHT: 212 LBS | TEMPERATURE: 98.2 F

## 2024-08-27 LAB
ALBUMIN SERPL-MCNC: 3.3 G/DL (ref 3.5–5.2)
ALBUMIN/GLOB SERPL: 1.3 G/DL
ALP SERPL-CCNC: 40 U/L (ref 39–117)
ALT SERPL W P-5'-P-CCNC: 13 U/L (ref 1–33)
AMPHET+METHAMPHET UR QL: NEGATIVE
AMPHETAMINES UR QL: NEGATIVE
ANION GAP SERPL CALCULATED.3IONS-SCNC: 9 MMOL/L (ref 5–15)
AST SERPL-CCNC: 12 U/L (ref 1–32)
BARBITURATES UR QL SCN: NEGATIVE
BENZODIAZ UR QL SCN: NEGATIVE
BILIRUB SERPL-MCNC: 0.5 MG/DL (ref 0–1.2)
BUN SERPL-MCNC: 3 MG/DL (ref 6–20)
BUN/CREAT SERPL: 6.3 (ref 7–25)
BUPRENORPHINE SERPL-MCNC: NEGATIVE NG/ML
CALCIUM SPEC-SCNC: 8.7 MG/DL (ref 8.6–10.5)
CANNABINOIDS SERPL QL: POSITIVE
CHLORIDE SERPL-SCNC: 104 MMOL/L (ref 98–107)
CO2 SERPL-SCNC: 25 MMOL/L (ref 22–29)
COCAINE UR QL: NEGATIVE
CREAT SERPL-MCNC: 0.48 MG/DL (ref 0.57–1)
DEPRECATED RDW RBC AUTO: 39.8 FL (ref 37–54)
EGFRCR SERPLBLD CKD-EPI 2021: 137.5 ML/MIN/1.73
ERYTHROCYTE [DISTWIDTH] IN BLOOD BY AUTOMATED COUNT: 13 % (ref 12.3–15.4)
FENTANYL UR-MCNC: NEGATIVE NG/ML
GLOBULIN UR ELPH-MCNC: 2.5 GM/DL
GLUCOSE SERPL-MCNC: 98 MG/DL (ref 65–99)
HCT VFR BLD AUTO: 33.5 % (ref 34–46.6)
HGB BLD-MCNC: 11.6 G/DL (ref 12–15.9)
MCH RBC QN AUTO: 29.1 PG (ref 26.6–33)
MCHC RBC AUTO-ENTMCNC: 34.6 G/DL (ref 31.5–35.7)
MCV RBC AUTO: 84 FL (ref 79–97)
METHADONE UR QL SCN: NEGATIVE
OPIATES UR QL: NEGATIVE
OXYCODONE UR QL SCN: NEGATIVE
PCP UR QL SCN: NEGATIVE
PLATELET # BLD AUTO: 198 10*3/MM3 (ref 140–450)
PMV BLD AUTO: 10.3 FL (ref 6–12)
POTASSIUM SERPL-SCNC: 3.3 MMOL/L (ref 3.5–5.2)
PROT SERPL-MCNC: 5.8 G/DL (ref 6–8.5)
RBC # BLD AUTO: 3.99 10*6/MM3 (ref 3.77–5.28)
SODIUM SERPL-SCNC: 138 MMOL/L (ref 136–145)
TRICYCLICS UR QL SCN: NEGATIVE
WBC NRBC COR # BLD AUTO: 6.52 10*3/MM3 (ref 3.4–10.8)

## 2024-08-27 PROCEDURE — 25010000002 POTASSIUM CHLORIDE PER 2 MEQ OF POTASSIUM: Performed by: OBSTETRICS & GYNECOLOGY

## 2024-08-27 PROCEDURE — 96366 THER/PROPH/DIAG IV INF ADDON: CPT

## 2024-08-27 PROCEDURE — 99238 HOSP IP/OBS DSCHRG MGMT 30/<: CPT | Performed by: OBSTETRICS & GYNECOLOGY

## 2024-08-27 PROCEDURE — 25010000002 METOCLOPRAMIDE PER 10 MG: Performed by: OBSTETRICS & GYNECOLOGY

## 2024-08-27 PROCEDURE — 85027 COMPLETE CBC AUTOMATED: CPT | Performed by: OBSTETRICS & GYNECOLOGY

## 2024-08-27 PROCEDURE — 25010000002 DIPHENHYDRAMINE PER 50 MG: Performed by: OBSTETRICS & GYNECOLOGY

## 2024-08-27 PROCEDURE — 25010000002 PYRIDOXINE PER 100 MG: Performed by: OBSTETRICS & GYNECOLOGY

## 2024-08-27 PROCEDURE — G0378 HOSPITAL OBSERVATION PER HR: HCPCS

## 2024-08-27 PROCEDURE — 80053 COMPREHEN METABOLIC PANEL: CPT | Performed by: OBSTETRICS & GYNECOLOGY

## 2024-08-27 PROCEDURE — 96376 TX/PRO/DX INJ SAME DRUG ADON: CPT

## 2024-08-27 PROCEDURE — 25810000003 DEXTROSE 5% IN LACTATED RINGERS PER 1000 ML: Performed by: OBSTETRICS & GYNECOLOGY

## 2024-08-27 PROCEDURE — 25010000002 MAGNESIUM SULFATE PER 500 MG OF MAGNESIUM: Performed by: OBSTETRICS & GYNECOLOGY

## 2024-08-27 PROCEDURE — 96361 HYDRATE IV INFUSION ADD-ON: CPT

## 2024-08-27 PROCEDURE — 25010000002 THIAMINE PER 100 MG: Performed by: OBSTETRICS & GYNECOLOGY

## 2024-08-27 RX ORDER — ONDANSETRON 4 MG/1
4 TABLET, ORALLY DISINTEGRATING ORAL EVERY 8 HOURS PRN
Qty: 20 TABLET | Refills: 0 | Status: SHIPPED | OUTPATIENT
Start: 2024-08-27

## 2024-08-27 RX ORDER — POTASSIUM CHLORIDE 750 MG/1
40 CAPSULE, EXTENDED RELEASE ORAL ONCE
Status: COMPLETED | OUTPATIENT
Start: 2024-08-27 | End: 2024-08-27

## 2024-08-27 RX ADMIN — DIPHENHYDRAMINE HYDROCHLORIDE 25 MG: 50 INJECTION INTRAMUSCULAR; INTRAVENOUS at 05:48

## 2024-08-27 RX ADMIN — POTASSIUM CHLORIDE: 2 INJECTION, SOLUTION, CONCENTRATE INTRAVENOUS at 10:08

## 2024-08-27 RX ADMIN — METOCLOPRAMIDE 10 MG: 5 INJECTION, SOLUTION INTRAMUSCULAR; INTRAVENOUS at 05:48

## 2024-08-27 RX ADMIN — FAMOTIDINE 20 MG: 10 INJECTION, SOLUTION INTRAVENOUS at 08:51

## 2024-08-27 RX ADMIN — SODIUM CHLORIDE, SODIUM LACTATE, POTASSIUM CHLORIDE, CALCIUM CHLORIDE AND DEXTROSE MONOHYDRATE 125 ML/HR: 5; 600; 310; 30; 20 INJECTION, SOLUTION INTRAVENOUS at 02:11

## 2024-08-27 RX ADMIN — POTASSIUM CHLORIDE 40 MEQ: 750 CAPSULE, EXTENDED RELEASE ORAL at 12:34

## 2024-08-27 NOTE — DISCHARGE SUMMARY
2024  HD:1  23 y.o. female  at 17w2d    Subjective   Onel feels better this morning. She has had no vomiting since admission.         Objective   Temp: Temp:  [97.2 °F (36.2 °C)-100.3 °F (37.9 °C)] 97.6 °F (36.4 °C) Temp src: Oral   BP: BP: (115-138)/(59-86) 122/86        Pulse: Heart Rate:  [57-78] 78  RR: Resp:  [16-20] 16    General:  nad   Abdomen: Gravid, nontender         Lab Results   Component Value Date    WBC 6.52 2024    HGB 11.6 (L) 2024    HCT 33.5 (L) 2024    MCV 84.0 2024     2024    HEPBSAG Negative 07/10/2024     Results from last 7 days   Lab Units 24  0843 24  1422   AST (SGOT) U/L 12 15     Results from last 7 days   Lab Units 24  0843 24  1422   ALT (SGPT) U/L 13 18      Results from last 7 days   Lab Units 24  0843 24  1422   CREATININE mg/dL 0.48* 0.47*      Results from last 7 days   Lab Units 24  0843 24  1422   BILIRUBIN mg/dL 0.5 0.6     +FHT    Assessment/ plan  1.   23 y.o. yo female  at 17w2d  2. Hyperemesis- will advance diet this morning, and as long as tolerates ok for DC home today in stable condition.   Cont to replace potassium  +THC      This note has been electronically signed.    Edilia Munoz MD  2024

## 2024-08-27 NOTE — PLAN OF CARE
Problem: Adult Inpatient Plan of Care  Goal: Plan of Care Review  Outcome: Ongoing, Progressing  Goal: Patient-Specific Goal (Individualized)  Outcome: Ongoing, Progressing  Goal: Absence of Hospital-Acquired Illness or Injury  Outcome: Ongoing, Progressing  Intervention: Identify and Manage Fall Risk  Recent Flowsheet Documentation  Taken 8/27/2024 0550 by Edwige Neville RN  Safety Promotion/Fall Prevention:   assistive device/personal items within reach   clutter free environment maintained   room organization consistent   safety round/check completed  Taken 8/27/2024 0357 by Edwige Neville RN  Safety Promotion/Fall Prevention:   assistive device/personal items within reach   clutter free environment maintained   room organization consistent   safety round/check completed  Taken 8/27/2024 0211 by Edwige Neville RN  Safety Promotion/Fall Prevention:   assistive device/personal items within reach   clutter free environment maintained   room organization consistent   safety round/check completed  Taken 8/26/2024 2345 by Edwige Neville RN  Safety Promotion/Fall Prevention:   assistive device/personal items within reach   clutter free environment maintained   room organization consistent   safety round/check completed  Taken 8/26/2024 2158 by Edwige Neville RN  Safety Promotion/Fall Prevention:   assistive device/personal items within reach   clutter free environment maintained   room organization consistent   safety round/check completed  Taken 8/26/2024 1942 by Edwige Neville RN  Safety Promotion/Fall Prevention:   assistive device/personal items within reach   clutter free environment maintained   room organization consistent   safety round/check completed  Intervention: Prevent Skin Injury  Recent Flowsheet Documentation  Taken 8/27/2024 0550 by Edwige Neville RN  Body Position:   neutral body alignment   neutral head position  Taken 8/27/2024 0357 by Edwige Neville RN  Body Position:   left    side-lying  Taken 8/27/2024 0211 by Edwige Neville RN  Body Position:   left   side-lying  Taken 8/26/2024 2345 by Edwige Neville RN  Body Position:   neutral body alignment   neutral head position  Taken 8/26/2024 2158 by Edwige Neville RN  Body Position:   right   side-lying  Taken 8/26/2024 1942 by Edwige Neville RN  Body Position:   right   tilted  Intervention: Prevent and Manage VTE (Venous Thromboembolism) Risk  Recent Flowsheet Documentation  Taken 8/26/2024 1942 by Edwige Neville RN  Activity Management: up ad alexus  VTE Prevention/Management:   bilateral   sequential compression devices off  Intervention: Prevent Infection  Recent Flowsheet Documentation  Taken 8/27/2024 0550 by Edwige Neville RN  Infection Prevention:   hand hygiene promoted   rest/sleep promoted  Taken 8/27/2024 0357 by Edwige Neville RN  Infection Prevention:   hand hygiene promoted   rest/sleep promoted  Taken 8/27/2024 0211 by Edwige Neville RN  Infection Prevention:   hand hygiene promoted   rest/sleep promoted  Taken 8/26/2024 2345 by Edwige Neville RN  Infection Prevention:   hand hygiene promoted   rest/sleep promoted  Taken 8/26/2024 2158 by Edwige Neville RN  Infection Prevention:   hand hygiene promoted   rest/sleep promoted  Taken 8/26/2024 1942 by Edwige Neville RN  Infection Prevention:   hand hygiene promoted   rest/sleep promoted  Goal: Optimal Comfort and Wellbeing  Outcome: Ongoing, Progressing  Intervention: Provide Person-Centered Care  Recent Flowsheet Documentation  Taken 8/27/2024 0550 by Edwige Neville RN  Trust Relationship/Rapport:   care explained   choices provided   emotional support provided   empathic listening provided   questions answered   questions encouraged   reassurance provided   thoughts/feelings acknowledged  Taken 8/27/2024 0357 by Edwige Neville RN  Trust Relationship/Rapport:   care explained   choices provided   questions answered   questions encouraged  Taken 8/27/2024 0211 by  Edwige Neville RN  Trust Relationship/Rapport:   care explained   choices provided  Taken 8/26/2024 2345 by Edwige Neville RN  Trust Relationship/Rapport:   care explained   choices provided   emotional support provided   empathic listening provided   questions answered   questions encouraged   reassurance provided   thoughts/feelings acknowledged  Taken 8/26/2024 2158 by Edwige Neville RN  Trust Relationship/Rapport:   care explained   choices provided   questions answered   questions encouraged  Taken 8/26/2024 1942 by Edwige Neville RN  Trust Relationship/Rapport:   care explained   choices provided   emotional support provided   empathic listening provided   questions answered   questions encouraged   reassurance provided   thoughts/feelings acknowledged  Goal: Readiness for Transition of Care  Outcome: Ongoing, Progressing   Goal Outcome Evaluation:

## 2024-08-28 ENCOUNTER — TELEPHONE (OUTPATIENT)
Dept: OBSTETRICS AND GYNECOLOGY | Facility: CLINIC | Age: 24
End: 2024-08-28
Payer: MEDICAID

## 2024-08-28 NOTE — TELEPHONE ENCOUNTER
KS patient- 17 3/7    Patient calling office due to continued vomiting s/p discharge from ED.  Patient in ER 8/26-8/27 for excessive vomiting. Patient reports that she has vomited twice since being home, has significant nausea, and diarrhea following large amounts of fluid intake.  She has used the phenergan suppositories, but reports that it only seems to make her sleep. She reports that she feels as if she is having acid reflux, and that is contributing to nausea and vomiting, and is only using zofran when having feelings of reflux.  She is able to drink a bottle of water, but eventually vomits it back up.  She is able to consume small amounts of soup intermittently.     Following discussing with Yeni- advised patient to use zofran as directed vs PRN for reflux.  Recommended patient starting daily pepcid to help control the reflux, which in turn could help with her nausea and vomiting.  Use phenergan suppositories at night, and PRN when nausea/vomiting is worse.  Advised patient to eat bland items such as dry cereals, plain white rice, crackers, toast in small increments and small sips of water/Gatorade/Pedialyte vs large increments at once. If no improvement, symptoms worsen, or unable to hold water down for >24 hours call back, or return to ED.  Patient verified and voiced understanding.

## 2024-08-30 LAB — REF LAB TEST METHOD: NORMAL

## 2024-08-31 ENCOUNTER — HOSPITAL ENCOUNTER (EMERGENCY)
Facility: HOSPITAL | Age: 24
Discharge: HOME | End: 2024-08-31
Attending: EMERGENCY MEDICINE
Payer: MEDICAID

## 2024-08-31 VITALS
HEART RATE: 83 BPM | DIASTOLIC BLOOD PRESSURE: 69 MMHG | OXYGEN SATURATION: 100 % | RESPIRATION RATE: 20 BRPM | HEIGHT: 67 IN | WEIGHT: 212.08 LBS | SYSTOLIC BLOOD PRESSURE: 121 MMHG | BODY MASS INDEX: 33.29 KG/M2 | TEMPERATURE: 98.2 F

## 2024-08-31 DIAGNOSIS — E87.6 HYPOKALEMIA: ICD-10-CM

## 2024-08-31 DIAGNOSIS — O21.0 HYPEREMESIS GRAVIDARUM: Primary | ICD-10-CM

## 2024-08-31 DIAGNOSIS — E86.0 DEHYDRATION: ICD-10-CM

## 2024-08-31 LAB
ALBUMIN SERPL-MCNC: 4.1 G/DL (ref 3.5–5.2)
ALBUMIN/GLOB SERPL: 1.2 G/DL
ALP SERPL-CCNC: 49 U/L (ref 39–117)
ALT SERPL W P-5'-P-CCNC: 17 U/L (ref 1–33)
ANION GAP SERPL CALCULATED.3IONS-SCNC: 13 MMOL/L (ref 5–15)
AST SERPL-CCNC: 16 U/L (ref 1–32)
BACTERIA UR QL AUTO: ABNORMAL /HPF
BASOPHILS # BLD AUTO: 0.02 10*3/MM3 (ref 0–0.2)
BASOPHILS NFR BLD AUTO: 0.2 % (ref 0–1.5)
BILIRUB SERPL-MCNC: 0.7 MG/DL (ref 0–1.2)
BILIRUB UR QL STRIP: ABNORMAL
BUN SERPL-MCNC: 6 MG/DL (ref 6–20)
BUN/CREAT SERPL: 11.1 (ref 7–25)
CALCIUM SPEC-SCNC: 9.5 MG/DL (ref 8.6–10.5)
CHLORIDE SERPL-SCNC: 98 MMOL/L (ref 98–107)
CLARITY UR: ABNORMAL
CO2 SERPL-SCNC: 22 MMOL/L (ref 22–29)
COLOR UR: ABNORMAL
CREAT SERPL-MCNC: 0.54 MG/DL (ref 0.57–1)
DEPRECATED RDW RBC AUTO: 37.8 FL (ref 37–54)
EGFRCR SERPLBLD CKD-EPI 2021: 132.9 ML/MIN/1.73
EOSINOPHIL # BLD AUTO: 0.06 10*3/MM3 (ref 0–0.4)
EOSINOPHIL NFR BLD AUTO: 0.7 % (ref 0.3–6.2)
ERYTHROCYTE [DISTWIDTH] IN BLOOD BY AUTOMATED COUNT: 12.9 % (ref 12.3–15.4)
GLOBULIN UR ELPH-MCNC: 3.3 GM/DL
GLUCOSE SERPL-MCNC: 123 MG/DL (ref 65–99)
GLUCOSE UR STRIP-MCNC: NEGATIVE MG/DL
GRAN CASTS URNS QL MICRO: ABNORMAL /LPF
HCG INTACT+B SERPL-ACNC: NORMAL MIU/ML
HCT VFR BLD AUTO: 37.3 % (ref 34–46.6)
HGB BLD-MCNC: 13.3 G/DL (ref 12–15.9)
HGB UR QL STRIP.AUTO: NEGATIVE
HOLD SPECIMEN: NORMAL
HYALINE CASTS UR QL AUTO: ABNORMAL /LPF
IMM GRANULOCYTES # BLD AUTO: 0.02 10*3/MM3 (ref 0–0.05)
IMM GRANULOCYTES NFR BLD AUTO: 0.2 % (ref 0–0.5)
KETONES UR QL STRIP: ABNORMAL
LEUKOCYTE ESTERASE UR QL STRIP.AUTO: ABNORMAL
LIPASE SERPL-CCNC: 21 U/L (ref 13–60)
LYMPHOCYTES # BLD AUTO: 1.6 10*3/MM3 (ref 0.7–3.1)
LYMPHOCYTES NFR BLD AUTO: 19.4 % (ref 19.6–45.3)
MCH RBC QN AUTO: 29 PG (ref 26.6–33)
MCHC RBC AUTO-ENTMCNC: 35.7 G/DL (ref 31.5–35.7)
MCV RBC AUTO: 81.4 FL (ref 79–97)
MONOCYTES # BLD AUTO: 0.55 10*3/MM3 (ref 0.1–0.9)
MONOCYTES NFR BLD AUTO: 6.7 % (ref 5–12)
MUCOUS THREADS URNS QL MICRO: ABNORMAL /HPF
NEUTROPHILS NFR BLD AUTO: 5.99 10*3/MM3 (ref 1.7–7)
NEUTROPHILS NFR BLD AUTO: 72.8 % (ref 42.7–76)
NITRITE UR QL STRIP: NEGATIVE
NRBC BLD AUTO-RTO: 0 /100 WBC (ref 0–0.2)
PH UR STRIP.AUTO: 6 [PH] (ref 5–8)
PLATELET # BLD AUTO: 252 10*3/MM3 (ref 140–450)
PMV BLD AUTO: 10.2 FL (ref 6–12)
POTASSIUM SERPL-SCNC: 3.2 MMOL/L (ref 3.5–5.2)
PROT SERPL-MCNC: 7.4 G/DL (ref 6–8.5)
PROT UR QL STRIP: ABNORMAL
RBC # BLD AUTO: 4.58 10*6/MM3 (ref 3.77–5.28)
RBC # UR STRIP: ABNORMAL /HPF
REF LAB TEST METHOD: ABNORMAL
SODIUM SERPL-SCNC: 133 MMOL/L (ref 136–145)
SP GR UR STRIP: 1.03 (ref 1–1.03)
SQUAMOUS #/AREA URNS HPF: ABNORMAL /HPF
UROBILINOGEN UR QL STRIP: ABNORMAL
WBC # UR STRIP: ABNORMAL /HPF
WBC CASTS #/AREA URNS LPF: ABNORMAL /LPF
WBC NRBC COR # BLD AUTO: 8.24 10*3/MM3 (ref 3.4–10.8)
WHOLE BLOOD HOLD COAG: NORMAL
WHOLE BLOOD HOLD SPECIMEN: NORMAL

## 2024-08-31 PROCEDURE — 25810000003 SODIUM CHLORIDE 0.9 % SOLUTION: Performed by: NURSE PRACTITIONER

## 2024-08-31 PROCEDURE — 25810000003 SODIUM CHLORIDE 0.9 % SOLUTION: Performed by: EMERGENCY MEDICINE

## 2024-08-31 PROCEDURE — 81001 URINALYSIS AUTO W/SCOPE: CPT | Performed by: EMERGENCY MEDICINE

## 2024-08-31 PROCEDURE — 25010000002 THIAMINE HCL 200 MG/2ML SOLUTION: Performed by: NURSE PRACTITIONER

## 2024-08-31 PROCEDURE — 99283 EMERGENCY DEPT VISIT LOW MDM: CPT

## 2024-08-31 PROCEDURE — 63710000001 PROMETHAZINE PER 25 MG: Performed by: NURSE PRACTITIONER

## 2024-08-31 PROCEDURE — 80053 COMPREHEN METABOLIC PANEL: CPT | Performed by: EMERGENCY MEDICINE

## 2024-08-31 PROCEDURE — 85025 COMPLETE CBC W/AUTO DIFF WBC: CPT | Performed by: EMERGENCY MEDICINE

## 2024-08-31 PROCEDURE — 87086 URINE CULTURE/COLONY COUNT: CPT | Performed by: NURSE PRACTITIONER

## 2024-08-31 PROCEDURE — 83690 ASSAY OF LIPASE: CPT | Performed by: EMERGENCY MEDICINE

## 2024-08-31 PROCEDURE — 84702 CHORIONIC GONADOTROPIN TEST: CPT | Performed by: EMERGENCY MEDICINE

## 2024-08-31 PROCEDURE — 96374 THER/PROPH/DIAG INJ IV PUSH: CPT

## 2024-08-31 RX ORDER — THIAMINE HYDROCHLORIDE 100 MG/ML
100 INJECTION, SOLUTION INTRAMUSCULAR; INTRAVENOUS ONCE
Status: COMPLETED | OUTPATIENT
Start: 2024-08-31 | End: 2024-08-31

## 2024-08-31 RX ORDER — PROMETHAZINE HYDROCHLORIDE 25 MG/1
25 TABLET ORAL ONCE
Status: COMPLETED | OUTPATIENT
Start: 2024-08-31 | End: 2024-08-31

## 2024-08-31 RX ORDER — POTASSIUM CHLORIDE 750 MG/1
20 CAPSULE, EXTENDED RELEASE ORAL ONCE
Status: COMPLETED | OUTPATIENT
Start: 2024-08-31 | End: 2024-08-31

## 2024-08-31 RX ORDER — SODIUM CHLORIDE 9 MG/ML
10 INJECTION, SOLUTION INTRAMUSCULAR; INTRAVENOUS; SUBCUTANEOUS AS NEEDED
Status: DISCONTINUED | OUTPATIENT
Start: 2024-08-31 | End: 2024-09-01 | Stop reason: HOSPADM

## 2024-08-31 RX ADMIN — PROMETHAZINE HYDROCHLORIDE 25 MG: 25 TABLET ORAL at 19:55

## 2024-08-31 RX ADMIN — THIAMINE HYDROCHLORIDE 100 MG: 100 INJECTION, SOLUTION INTRAMUSCULAR; INTRAVENOUS at 19:54

## 2024-08-31 RX ADMIN — SODIUM CHLORIDE 1000 ML: 9 INJECTION, SOLUTION INTRAVENOUS at 18:57

## 2024-08-31 RX ADMIN — SODIUM CHLORIDE 1000 ML: 9 INJECTION, SOLUTION INTRAVENOUS at 19:55

## 2024-08-31 RX ADMIN — POTASSIUM CHLORIDE 20 MEQ: 750 CAPSULE, EXTENDED RELEASE ORAL at 22:43

## 2024-09-01 NOTE — ED PROVIDER NOTES
Subjective   History of Present Illness  Patient presents ER for nausea vomiting.  Recently admitted for hyperemesis gravidarum this is similar.  She has felt the baby move.  She denies any urinary symptoms except for some dark urine.  She denies any chest pain or difficulty breathing.  This is her second pregnancy with the first pregnancy ending in miscarriage early on.  Old records reviewed.  There is a history of THC use.  The nurse advised me that she told him that she has not been using any THC recently.        Review of Systems    Past Medical History:   Diagnosis Date    Arthritis     Graves disease     Can’t exactly remember.    Hashimoto's thyroiditis     Can’t exactly remember.    Hypertension     Hyperthyroidism     Hypothyroidism     Kidney stone     Lichen sclerosus     Ovarian cyst        Allergies   Allergen Reactions    Augmentin [Amoxicillin-Pot Clavulanate] Rash       Past Surgical History:   Procedure Laterality Date    KNEE SURGERY Bilateral     bilateral ACL    LAPAROSCOPIC CHOLECYSTECTOMY      TONSILLECTOMY         Family History   Problem Relation Age of Onset    Hypertension Mother     Arthritis Mother     Hypertension Father     Mental illness Father     Diabetes Father     Diabetes Other     Cancer Other     Diabetes Paternal Grandmother     Thyroid disease Paternal Aunt         Dads side       Social History     Socioeconomic History    Marital status: Single   Tobacco Use    Smoking status: Former     Types: Cigarettes     Start date:      Quit date:      Years since quittin.6     Passive exposure: Never    Smokeless tobacco: Never   Vaping Use    Vaping status: Every Day    Substances: Nicotine    Devices: Disposable    Passive vaping exposure: Yes   Substance and Sexual Activity    Alcohol use: Never    Drug use: Not Currently     Types: Marijuana     Comment: Stopped with pregnancy    Sexual activity: Yes     Partners: Male           Objective   Physical  Exam  Constitutional:       Appearance: She is well-developed.   HENT:      Head: Normocephalic and atraumatic.      Right Ear: External ear normal.      Left Ear: External ear normal.      Nose: Nose normal.   Eyes:      Conjunctiva/sclera: Conjunctivae normal.      Pupils: Pupils are equal, round, and reactive to light.   Cardiovascular:      Rate and Rhythm: Normal rate and regular rhythm.      Heart sounds: Normal heart sounds.   Pulmonary:      Effort: Pulmonary effort is normal.      Breath sounds: Normal breath sounds.   Abdominal:      General: Bowel sounds are normal.      Palpations: Abdomen is soft.   Musculoskeletal:         General: Normal range of motion.      Cervical back: Normal range of motion and neck supple.   Skin:     General: Skin is warm and dry.   Neurological:      Mental Status: She is alert and oriented to person, place, and time.   Psychiatric:         Behavior: Behavior normal.         Thought Content: Thought content normal.         Judgment: Judgment normal.         Procedures           ED Course  ED Course as of 08/31/24 2228   Sat Aug 31, 2024   2018 Patient resting company.  We have given her second liter of fluids.  If she is continued to feel poorly we will get her admitted however she feels much better we may be able to discharge home.  Ritual includes hyperemesis related to pregnancy.  There is also been a reported history of thc use [JM]   2224 Patient resting comfortably.  She does not want to be admitted.  She wants to go home.  She tells me she has enough nausea medication at home.  The patient's second nurse of her stay advised that the patient also spoke to her about THC use.  It was recommended to avoid this as this can cause worsening nausea vomiting even if it helps for a brief time.  She advised the nurse that she will stop any more THC use.  She will follow-up with her OB/GYN.  Once again patient feels better she is ready go home fetal heart tones were in the 160s  and she has felt the baby move and she denies any vaginal bleeding.  She is ready to go home. [MAINE]   2225 And she declines admission. [JM]      ED Course User Index  [JM] Young Euceda APRN                                 No orders to display                 Medical Decision Making  Problems Addressed:  Dehydration: complicated acute illness or injury  Hyperemesis gravidarum: complicated acute illness or injury  Hypokalemia: complicated acute illness or injury    Amount and/or Complexity of Data Reviewed  External Data Reviewed: labs, radiology and ECG.  Labs: ordered. Decision-making details documented in ED Course.  Radiology:  Decision-making details documented in ED Course.  ECG/medicine tests:  Decision-making details documented in ED Course.    Risk  Prescription drug management.        Final diagnoses:   Hyperemesis gravidarum   Dehydration   Hypokalemia       ED Disposition  ED Disposition       ED Disposition   Discharge    Condition   Stable    Comment   --               Eliana Hodgson PA  [address]  [address]  [place] [address]  [phone]    Schedule an appointment as soon as possible for a visit       Edilia Munoz MD  [address] [facility] [address]  [facility] [address]  [phone]    Schedule an appointment as soon as possible for a visit       [facility] EMERGENCY DEPARTMENT  [address] [facility] [address] [phone]    If symptoms worsen         Medication List      No changes were made to your prescriptions during this visit.            Young Euceda APRN  08/31/24 222

## 2024-09-02 LAB — BACTERIA SPEC AEROBE CULT: NO GROWTH

## 2024-09-11 ENCOUNTER — ROUTINE PRENATAL (OUTPATIENT)
Dept: OBSTETRICS AND GYNECOLOGY | Facility: CLINIC | Age: 24
End: 2024-09-11
Payer: MEDICAID

## 2024-09-11 VITALS — WEIGHT: 205 LBS | DIASTOLIC BLOOD PRESSURE: 80 MMHG | BODY MASS INDEX: 32.11 KG/M2 | SYSTOLIC BLOOD PRESSURE: 130 MMHG

## 2024-09-11 DIAGNOSIS — F12.90 MARIJUANA USE DURING PREGNANCY: ICD-10-CM

## 2024-09-11 DIAGNOSIS — R82.71 GBS BACTERIURIA: ICD-10-CM

## 2024-09-11 DIAGNOSIS — Z3A.19 19 WEEKS GESTATION OF PREGNANCY: Primary | ICD-10-CM

## 2024-09-11 DIAGNOSIS — O99.320 MARIJUANA USE DURING PREGNANCY: ICD-10-CM

## 2024-09-11 DIAGNOSIS — O99.330 TOBACCO USE DURING PREGNANCY, ANTEPARTUM: ICD-10-CM

## 2024-09-11 DIAGNOSIS — E03.8 HYPOTHYROIDISM DUE TO HASHIMOTO'S THYROIDITIS: ICD-10-CM

## 2024-09-11 DIAGNOSIS — E06.3 HYPOTHYROIDISM DUE TO HASHIMOTO'S THYROIDITIS: ICD-10-CM

## 2024-09-11 DIAGNOSIS — Z86.79 HISTORY OF CHRONIC HYPERTENSION: ICD-10-CM

## 2024-09-11 LAB
BILIRUB BLD-MCNC: NEGATIVE MG/DL
GLUCOSE UR STRIP-MCNC: NEGATIVE MG/DL
GLUCOSE UR STRIP-MCNC: NEGATIVE MG/DL
KETONES UR QL: NEGATIVE
LEUKOCYTE EST, POC: NEGATIVE
NITRITE UR-MCNC: NEGATIVE MG/ML
PH UR: 8 [PH] (ref 5–8)
PROT UR STRIP-MCNC: NEGATIVE MG/DL
PROT UR STRIP-MCNC: NEGATIVE MG/DL
RBC # UR STRIP: NEGATIVE /UL
SP GR UR: 1.01 (ref 1–1.03)
UROBILINOGEN UR QL: NORMAL

## 2024-09-11 NOTE — PROGRESS NOTES
OB FOLLOW UP  CC- Here for care of pregnancy        Onel Zapata is a 24 y.o.  19w3d patient being seen today for her obstetrical follow up visit. Patient reports no complaints.     She was seen at L&D for nausea and vomiting on 24.     Her prenatal care is complicated by (and status) : Chronic HTN. Her average BP has been 130/80.  Patient Active Problem List   Diagnosis    Hypothyroidism due to Hashimoto's thyroiditis    Pregnancy    Tobacco use during pregnancy, antepartum    Marijuana use during pregnancy    History of chronic hypertension    GBS bacteriuria    Hyperemesis gravidarum         Ultrasound Today: Yes, anatomy scan      ROS -     Patient Denies: leaking of fluid, vaginal bleeding, dysuria, excessive vomiting, and more than 6 contractions per hour  Fetal Movement : Yes  All other systems reviewed and are negative.       The additional following portions of the patient's history were reviewed and updated as appropriate: allergies and current medications.      I have reviewed and agree with the HPI, ROS, and historical information as entered above. Edilia Munoz MD      /80   Wt 93 kg (205 lb)   LMP 2024   BMI 32.11 kg/m²       EXAM:     Prenatal Vitals  BP: 130/80  Weight: 93 kg (205 lb)   Fetal Heart Rate: +          Urine Glucose Read-only: Negative  Urine Protein Read-only: Negative       Assessment and Plan    Problem List Items Addressed This Visit          Gynecologic and Obstetric Problems    Marijuana use during pregnancy    Overview     quit            Other    Hypothyroidism due to Hashimoto's thyroiditis    Overview     TSH 6 prior to nob, but not taking synthroid. Refer endocrine.          Relevant Medications    levothyroxine (Synthroid) 75 MCG tablet    Pregnancy - Primary    Overview     cfDNA low risk         Relevant Orders    POC Urinalysis Dipstick (Completed)    US Ob Follow Up Transabdominal Approach    Tobacco use during pregnancy,  antepartum    History of chronic hypertension    Overview     Resolved after weight loss 2-3 yrs ago. Was on 2 different meds. But normal since then   Baby asa         GBS bacteriuria       Pregnancy at 19w3d. Her nausea has resolved, and no further vomiting. She has stopped smoking THC.  Anatomy scan today is incomplete, follow up in 4 weeks for additional views. Anatomy that was visualized was within normal limits.  Fetal status reassuring.   She has FU with endocrine scheduled.   Activity and Exercise discussed.  Patient is on Prenatal vitamins  Return in about 1 month (around 10/11/2024) for F/U Prenatal, U/S Next Visit.      Edilia Munoz MD  09/11/2024

## 2024-09-13 ENCOUNTER — PATIENT OUTREACH (OUTPATIENT)
Dept: LABOR AND DELIVERY | Facility: HOSPITAL | Age: 24
End: 2024-09-13
Payer: MEDICAID

## 2024-09-13 NOTE — OUTREACH NOTE
[facility]  Check-In    Current Estimated Gestational Age: 19w5d      Questions/Answers      Flowsheet Row Responses   Best Method for Contacting Cell   Demographics Reviewed Yes   Currently Employed Yes   Able to keep appointments as scheduled Yes   Gender(s) and Name(s) Girl   Baby Active/Feeling Fetal Movemen Yes   How are you presently feeling? Pretty good now that I'm not throwing up   Are you having any of the following symptoms? Nausea/Vomiting   Questions regarding prenatal visits or tests to be ordered? No   New Diagnosis Hypermesis   Education related to new diagnoses/home equipment No   May I ask you questions about your substance use? Yes   Resource/Environmental Concerns Financial   Do you have any questions related to your care experience, your pregnancy, plans for delivery, any concerns, etc? No   Other Education HANDS, WIC Benefits, SNAP Benefits          Feeling well and reports good fetal movement. Denies any concerning symptoms but discussed what to watch out for. Discussed that she still has time for childbirth education if she is interested. Discussed breastfeeding video and outpatient lactation clinic support as well as WIC support. She has begun gathering items she will need for baby.    Updated resources provided via [employer] message. Contact information provided. Encouraged to call with questions, concerns, or for support. Will plan f/u around 24 weeks for updated needs assessment and wellness check.     Beba Gusman RN  Maternity Nurse Navigator    9/13/2024, 13:13 EDT

## 2024-10-08 ENCOUNTER — TELEPHONE (OUTPATIENT)
Dept: ENDOCRINOLOGY | Facility: CLINIC | Age: 24
End: 2024-10-08
Payer: MEDICAID

## 2024-10-08 RX ORDER — LEVOTHYROXINE SODIUM 75 UG/1
75 CAPSULE ORAL DAILY
Qty: 90 CAPSULE | Refills: 1 | Status: SHIPPED | OUTPATIENT
Start: 2024-10-08

## 2024-10-08 NOTE — TELEPHONE ENCOUNTER
Caller: Onel Zapata    Relationship to patient: Self    Best call back number: [phone]     Patient is needing: PT CALLED TO LET YOU KNOW THAT THE SAMPLE MEDICINE YOU GAVE HER WORKS BETTER AND SHE WOULD LIKE A PRESCRIPTION OF IT       [id number] - [place], [place] - [address] [place] [address] AT [place] [address] - [phone]  - [phone] [address] [address]  Phone: [phone]  Fax: [phone]

## 2024-10-08 NOTE — TELEPHONE ENCOUNTER
Tirosint 75 mcg is pending.    Last office visit with prescribing clinician: 8/16/2024       Next office visit with prescribing clinician: 2/21/2025     {

## 2024-10-08 NOTE — TELEPHONE ENCOUNTER
Tirosint 75 mcg daily sent to [facility]. It will need a PA. Does she need more samples for in the meantime?

## 2024-10-09 ENCOUNTER — ROUTINE PRENATAL (OUTPATIENT)
Dept: OBSTETRICS AND GYNECOLOGY | Facility: CLINIC | Age: 24
End: 2024-10-09
Payer: MEDICAID

## 2024-10-09 VITALS — WEIGHT: 213.2 LBS | SYSTOLIC BLOOD PRESSURE: 122 MMHG | DIASTOLIC BLOOD PRESSURE: 70 MMHG | BODY MASS INDEX: 33.39 KG/M2

## 2024-10-09 DIAGNOSIS — Z34.90 PRENATAL CARE, ANTEPARTUM: Primary | ICD-10-CM

## 2024-10-09 DIAGNOSIS — E06.3 HYPOTHYROIDISM DUE TO HASHIMOTO'S THYROIDITIS: ICD-10-CM

## 2024-10-09 DIAGNOSIS — Z86.79 HISTORY OF CHRONIC HYPERTENSION: ICD-10-CM

## 2024-10-09 DIAGNOSIS — O99.330 TOBACCO USE DURING PREGNANCY, ANTEPARTUM: ICD-10-CM

## 2024-10-09 DIAGNOSIS — Z3A.23 23 WEEKS GESTATION OF PREGNANCY: ICD-10-CM

## 2024-10-09 DIAGNOSIS — R82.71 GBS BACTERIURIA: ICD-10-CM

## 2024-10-09 LAB
GLUCOSE UR STRIP-MCNC: NEGATIVE MG/DL
PROT UR STRIP-MCNC: NEGATIVE MG/DL

## 2024-10-09 NOTE — TELEPHONE ENCOUNTER
Called the pt and told her if her script is denied at the phar to have them send me the denial.    She is going to call back if she needs samples

## 2024-10-09 NOTE — PROGRESS NOTES
OB FOLLOW UP  CC- Here for care of pregnancy        Onel Zapata is a 24 y.o.  23w3d patient being seen today for her obstetrical follow up visit. Patient reports no complaints.     Endocrinologist has changed hypothyroid medication from Synthroid to Tirosint. Pt states Tirosint causes less fatigue.     Her prenatal care is complicated by (and status) :   Patient Active Problem List   Diagnosis    Hypothyroidism due to Hashimoto's thyroiditis    Pregnancy    Tobacco use during pregnancy, antepartum    Marijuana use during pregnancy    History of chronic hypertension    GBS bacteriuria    Hyperemesis gravidarum       Flu Status: Desires at future appt  Ultrasound Today: Yes, follow up anatomy   Reviewed 1 hr glucose testing and TDAP next visit.    ROS -   Patient Denies: leaking of fluid, vaginal bleeding, dysuria, excessive vomiting, and more than 6 contractions per hour  Fetal Movement : normal  All other systems reviewed and are negative.       The additional following portions of the patient's history were reviewed and updated as appropriate: allergies, current medications, past family history, past medical history, past social history, past surgical history, and problem list.      I have reviewed and agree with the HPI, ROS, and historical information as entered above. Edilia Munoz MD      /70   Wt 96.7 kg (213 lb 3.2 oz)   LMP 2024   BMI 33.39 kg/m²       EXAM:     Prenatal Vitals  BP: 122/70  Weight: 96.7 kg (213 lb 3.2 oz)   Fetal Heart Rate: 149               Urine Glucose Read-only: Negative  Urine Protein Read-only: Negative       Assessment and Plan    Problem List Items Addressed This Visit       Hypothyroidism due to Hashimoto's thyroiditis    Overview     TSH 6 prior to nob, but not taking synthroid. Refer endocrine.          Relevant Medications    levothyroxine (Synthroid) 75 MCG tablet    Tirosint 75 MCG capsule    Pregnancy    Overview     cfDNA low risk          Tobacco use during pregnancy, antepartum    History of chronic hypertension    Overview     Resolved after weight loss 2-3 yrs ago. Was on 2 different meds. But normal since then   Baby asa         GBS bacteriuria     Other Visit Diagnoses       Prenatal care, antepartum    -  Primary    Relevant Orders    POC Urinalysis Dipstick (Completed)            Pregnancy at 23w3d  Fetal status reassuring.  anatomy scan completed today and within normal limits.  1 hour gtt, CBC, Antibody screen, TDAP, and RPR next visit. Instructions given  Discussed/encouraged TDAP vaccination after 28 weeks  Activity and Exercise discussed.  Return in about 1 month (around 11/9/2024) for F/U Prenatal, and glucola.      Edilia Munoz MD  10/09/2024

## 2024-10-09 NOTE — TELEPHONE ENCOUNTER
Provider: MELI    Caller: IAIN MANRIQUEZ    Relationship to Patient: SELF    Pharmacy: [facility]    Phone Number: [phone]    Reason for Call: PATIENT STATES THE DENIAL FOR THE [facility]int [facility]cg [facility] HAS BEEN [facility]XED TO THE OFFICE

## 2024-11-08 ENCOUNTER — TELEPHONE (OUTPATIENT)
Dept: ENDOCRINOLOGY | Facility: CLINIC | Age: 24
End: 2024-11-08

## 2024-11-08 NOTE — TELEPHONE ENCOUNTER
Caller: IAIN MANRIQUEZ    Relationship:PATIENT    Callback number: [phone]   Is it ok to leave a message: [x] Yes [] No    Requested medication for samples: TIROSINT    How much medication does the patient currently have left: HAS A FEW DAYS LEFT    Who will be picking up the samples: PATIENT    Do you need information about patient financial assistance for this medication: [] Yes [x] No    Additional details provided: JUST GOT INSURANCE RESOLVED BUT WILL BE A WHILE BEFORE IT WILL BE ACTIVATED TO PAY FOR HER PRESCRIPTION SO WILL NEED A SAMPLE TO HOLD HER OVER UNTIL THEN

## 2024-11-12 ENCOUNTER — ROUTINE PRENATAL (OUTPATIENT)
Dept: OBSTETRICS AND GYNECOLOGY | Facility: CLINIC | Age: 24
End: 2024-11-12
Payer: MEDICAID

## 2024-11-12 VITALS — BODY MASS INDEX: 34.52 KG/M2 | DIASTOLIC BLOOD PRESSURE: 76 MMHG | SYSTOLIC BLOOD PRESSURE: 120 MMHG | WEIGHT: 220.4 LBS

## 2024-11-12 DIAGNOSIS — Z86.79 HISTORY OF CHRONIC HYPERTENSION: ICD-10-CM

## 2024-11-12 DIAGNOSIS — Z34.90 PRENATAL CARE, ANTEPARTUM: Primary | ICD-10-CM

## 2024-11-12 DIAGNOSIS — E06.3 HYPOTHYROIDISM DUE TO HASHIMOTO'S THYROIDITIS: ICD-10-CM

## 2024-11-12 LAB
GLUCOSE UR STRIP-MCNC: NEGATIVE MG/DL
PROT UR STRIP-MCNC: NEGATIVE MG/DL

## 2024-11-12 NOTE — PROGRESS NOTES
OB FOLLOW UP  CC- Here for care of pregnancy        Onel Zapata is a 24 y.o.  28w2d patient being seen today for her obstetrical follow up. Patient reports no complaints.     Patient undergoing Glucola testing today. She is due for her testing at 10:06.       MBT: A+  Rhogam: is not indicated.  28 week packet: reviewed with patient , counseled on fetal movement , pediatrician list reviewed, breast pump discussed, and childbirth classes reviewed  TDAP: given today  Flu Status: Desires at future appt  Ultrasound Today: No    Her prenatal care is complicated by (and status) : see below.  Patient Active Problem List   Diagnosis    Hypothyroidism due to Hashimoto's thyroiditis    Pregnancy    Tobacco use during pregnancy, antepartum    Marijuana use during pregnancy    History of chronic hypertension    GBS bacteriuria    Hyperemesis gravidarum         ROS -   Patient Denies: Loss of Fluid, Vaginal Spotting, Vision Changes, Headaches, Nausea , Vomiting , Contractions, Epigastric pain, and skin itching  Fetal Movement : normal    The additional following portions of the patient's history were reviewed and updated as appropriate: allergies and current medications.    I have reviewed and agree with the HPI, ROS, and historical information as entered above. Dahlia Sharif, APRN      /76   Wt 100 kg (220 lb 6.4 oz)   LMP 2024   BMI 34.52 kg/m²         EXAM:     Prenatal Vitals  BP: 120/76  Weight: 100 kg (220 lb 6.4 oz)   Fetal Heart Rate: 155               Urine Glucose Read-only: Negative  Urine Protein Read-only: Negative         Assessment and Plan    Problem List Items Addressed This Visit          Cardiac and Vasculature    History of chronic hypertension    Overview     Resolved after weight loss 2-3 yrs ago. Was on 2 different meds. But normal since then   Baby asa            Endocrine and Metabolic    Hypothyroidism due to Hashimoto's thyroiditis    Overview     TSH 6 prior to  nob, but not taking synthroid. Refer endocrine.          Relevant Medications    Tirosint 75 MCG capsule    Other Relevant Orders    TSH+Free T4     Other Visit Diagnoses       Prenatal care, antepartum    -  Primary    Relevant Orders    POC Urinalysis Dipstick (Completed)    CBC (No Diff)    Gestational Screen 1 Hr ([facility])    Antibody Screen    RPR Qualitative with Reflex to Quant    Tdap Vaccine Greater Than or Equal To 8yo IM (Completed)            Pregnancy at 28w2d  1 hr Glucola, CBC, RPR. Antibody screen and TDAP today  Fetal movement/PTL or Labor precautions  Lab(s) Ordered  Patient is on Prenatal vitamins  Discussed/encouraged TDAP vaccination after 28 weeks  Reviewed Pre-eclampsia signs/symptoms  Discussed bASA for PIH prevention from 12 to 36wk  Activity and Exercise discussed.  Patient taking Tirosint for her hypothyroidism-labs checked today will review with her endocrinologist.   Follow up in four weeks tashia Sharif, APRN  11/12/2024

## 2024-11-13 LAB
BLD GP AB SCN SERPL QL: NEGATIVE
ERYTHROCYTE [DISTWIDTH] IN BLOOD BY AUTOMATED COUNT: 12.6 % (ref 12.3–15.4)
GLUCOSE 1H P 50 G GLC PO SERPL-MCNC: 87 MG/DL (ref 65–139)
HCT VFR BLD AUTO: 34.3 % (ref 34–46.6)
HGB BLD-MCNC: 11.5 G/DL (ref 12–15.9)
MCH RBC QN AUTO: 30.6 PG (ref 26.6–33)
MCHC RBC AUTO-ENTMCNC: 33.5 G/DL (ref 31.5–35.7)
MCV RBC AUTO: 91.2 FL (ref 79–97)
PLATELET # BLD AUTO: 197 10*3/MM3 (ref 140–450)
RBC # BLD AUTO: 3.76 10*6/MM3 (ref 3.77–5.28)
RPR SER QL: NON REACTIVE
T4 FREE SERPL-MCNC: 0.81 NG/DL (ref 0.92–1.68)
TSH SERPL DL<=0.005 MIU/L-ACNC: 3.08 UIU/ML (ref 0.27–4.2)
WBC # BLD AUTO: 7.28 10*3/MM3 (ref 3.4–10.8)

## 2024-11-14 ENCOUNTER — PATIENT OUTREACH (OUTPATIENT)
Dept: LABOR AND DELIVERY | Facility: HOSPITAL | Age: 24
End: 2024-11-14
Payer: MEDICAID

## 2024-11-14 NOTE — OUTREACH NOTE
[facility]  Unable to Reach    Questions/Answers      Flowsheet Row Responses   Pending Outreach Prenatal Check-in   Call Attempt First   Outcome No answer/busy, Left message   Next Call Attempt Date 11/20/24   Unable to reach comments: LVM asking for return call. Will attempt contact next week.            Beba Gusman RN  Maternity Nurse Navigator    11/14/2024, 13:45 EST

## 2024-11-20 ENCOUNTER — PATIENT OUTREACH (OUTPATIENT)
Dept: LABOR AND DELIVERY | Facility: HOSPITAL | Age: 24
End: 2024-11-20
Payer: MEDICAID

## 2024-11-20 NOTE — OUTREACH NOTE
[facility]  Unable to Reach    Questions/Answers      Flowsheet Row Responses   Pending Outreach Prenatal Check-in   Call Attempt Second   Outcome No answer/busy, Left message, [employer] message sent to patient   Next Call Attempt Date 01/02/25   Unable to reach comments: LVM asking for return call. Will plan next contact around 35 wks.          After two failed attempts to reach Onel for 28 wk check in will plan next contact for 35 wks. Updated resources sent via [employer].     Beba Gusman RN  Maternity Nurse Navigator    11/20/2024, 16:08 EST

## 2024-12-10 ENCOUNTER — ROUTINE PRENATAL (OUTPATIENT)
Dept: OBSTETRICS AND GYNECOLOGY | Facility: CLINIC | Age: 24
End: 2024-12-10
Payer: MEDICAID

## 2024-12-10 VITALS — BODY MASS INDEX: 36.52 KG/M2 | DIASTOLIC BLOOD PRESSURE: 76 MMHG | SYSTOLIC BLOOD PRESSURE: 118 MMHG | WEIGHT: 233.2 LBS

## 2024-12-10 DIAGNOSIS — O99.330 TOBACCO USE DURING PREGNANCY, ANTEPARTUM: ICD-10-CM

## 2024-12-10 DIAGNOSIS — R82.71 GBS BACTERIURIA: ICD-10-CM

## 2024-12-10 DIAGNOSIS — Z3A.32 32 WEEKS GESTATION OF PREGNANCY: ICD-10-CM

## 2024-12-10 DIAGNOSIS — Z34.93 PRENATAL CARE IN THIRD TRIMESTER: Primary | ICD-10-CM

## 2024-12-10 DIAGNOSIS — Z86.79 HISTORY OF CHRONIC HYPERTENSION: ICD-10-CM

## 2024-12-10 PROBLEM — O21.0 HYPEREMESIS GRAVIDARUM: Status: RESOLVED | Noted: 2024-08-26 | Resolved: 2024-12-10

## 2024-12-10 LAB
GLUCOSE UR STRIP-MCNC: NEGATIVE MG/DL
PROT UR STRIP-MCNC: NEGATIVE MG/DL

## 2024-12-10 RX ORDER — FAMOTIDINE 20 MG/1
20 TABLET, FILM COATED ORAL 2 TIMES DAILY
COMMUNITY

## 2024-12-10 NOTE — PROGRESS NOTES
OB FOLLOW UP  CC- Here for care of pregnancy        Onel Zapata is a 24 y.o.  32w2d patient being seen today for her obstetrical follow up visit. Patient reports heartburn, she is taking Pepcid with minimal improvement.     Her prenatal care is complicated by (and status) :  see below.  Patient Active Problem List   Diagnosis    Hypothyroidism due to Hashimoto's thyroiditis    Pregnancy    Tobacco use during pregnancy, antepartum    Marijuana use during pregnancy    History of chronic hypertension    GBS bacteriuria       Flu Status: Declines  TDAP status: given today  RSV vaccine: declines.  Rhogam status: was not indicated  28 week labs: Reviewed and normal  Ultrasound Today: No  Non Stress Test: No.      ROS -   Patient Denies: Loss of Fluid, Vaginal Spotting, Vision Changes, Headaches, Contractions, Epigastric pain, and skin itching  Fetal Movement : normal  All other systems reviewed and are negative.       The additional following portions of the patient's history were reviewed and updated as appropriate: allergies, current medications, past family history, past medical history, past social history, past surgical history, and problem list.    I have reviewed and agree with the HPI, ROS, and historical information as entered above. Edilia Munoz MD      /76   Wt 106 kg (233 lb 3.2 oz)   LMP 2024   BMI 36.52 kg/m²         EXAM:     Prenatal Vitals  BP: 118/76  Weight: 106 kg (233 lb 3.2 oz)                   Urine Glucose Read-only: Negative  Urine Protein Read-only: Negative           Assessment and Plan    Problem List Items Addressed This Visit       Pregnancy    Overview     cfDNA low risk         Tobacco use during pregnancy, antepartum    Relevant Orders    US Ob Follow Up Transabdominal Approach    History of chronic hypertension    Overview     Resolved after weight loss 2-3 yrs ago. Was on 2 different meds. But normal since then   Baby asa         GBS bacteriuria     Relevant Orders    US Ob Follow Up Transabdominal Approach     Other Visit Diagnoses       Prenatal care in third trimester    -  Primary    Relevant Orders    POC Urinalysis Dipstick (Completed)    TSH+Free T4            Pregnancy at 32w2d  Fetal status reassuring.  28 week labs reviewed.    Activity and Exercise discussed.  Fetal movement/PTL or Labor precautions  U/S ordered at follow up  Return in about 2 weeks (around 12/24/2024) for F/U Prenatal, U/S Next Visit.    Edilia Munoz MD  12/10/2024

## 2024-12-13 ENCOUNTER — PATIENT ROUNDING (BHMG ONLY) (OUTPATIENT)
Dept: URGENT CARE | Facility: CLINIC | Age: 24
End: 2024-12-13
Payer: MEDICAID

## 2024-12-23 ENCOUNTER — TELEPHONE (OUTPATIENT)
Dept: OBSTETRICS AND GYNECOLOGY | Facility: CLINIC | Age: 24
End: 2024-12-23
Payer: MEDICAID

## 2024-12-23 NOTE — TELEPHONE ENCOUNTER
Hub staff attempted to follow warm transfer process and was unsuccessful     Caller: Onel Zapata    Relationship to patient: Self    Best call back number: [phone]  CALL ANYTIME. IT IS OKAY TO LVM.    Patient is needing: OB PATIENT CALLED REQUESTING TO RESCHEDULE APPTS ON 12/26/24, 10:30 AM ULTRASOUND AND 11:20 AM OB FOLLOW UP WITH . PATIENT IS REQUESTING 12/27 OR 12/30 WITH DR. JACOBS OR NP.

## 2024-12-31 ENCOUNTER — ROUTINE PRENATAL (OUTPATIENT)
Dept: OBSTETRICS AND GYNECOLOGY | Facility: CLINIC | Age: 24
End: 2024-12-31
Payer: MEDICAID

## 2024-12-31 VITALS — BODY MASS INDEX: 38.12 KG/M2 | DIASTOLIC BLOOD PRESSURE: 78 MMHG | WEIGHT: 243.4 LBS | SYSTOLIC BLOOD PRESSURE: 122 MMHG

## 2024-12-31 DIAGNOSIS — Z86.79 HISTORY OF CHRONIC HYPERTENSION: ICD-10-CM

## 2024-12-31 DIAGNOSIS — Z34.90 PRENATAL CARE, ANTEPARTUM: Primary | ICD-10-CM

## 2024-12-31 DIAGNOSIS — E06.3 HYPOTHYROIDISM DUE TO HASHIMOTO'S THYROIDITIS: ICD-10-CM

## 2024-12-31 LAB
GLUCOSE UR STRIP-MCNC: NEGATIVE MG/DL
PROT UR STRIP-MCNC: NEGATIVE MG/DL

## 2024-12-31 NOTE — PROGRESS NOTES
OB FOLLOW UP  CC- Here for care of pregnancy        Onel Zapata is a 24 y.o.  35w2d patient being seen today for her obstetrical follow up visit. Patient reports no complaints.     Her prenatal care is complicated by (and status) : see below.  Patient Active Problem List   Diagnosis    Hypothyroidism due to Hashimoto's thyroiditis    Pregnancy    Tobacco use during pregnancy, antepartum    Marijuana use during pregnancy    History of chronic hypertension    GBS bacteriuria       Flu Status: Declines  RSV: declines.  Ultrasound Today: Yes  Non Stress Test: No.    ROS -   Patient Denies: Loss of Fluid, Vaginal Spotting, Vision Changes, Headaches, Nausea , Vomiting , Contractions, Epigastric pain, and skin itching  Fetal Movement : normal  All other systems reviewed and are negative.       The additional following portions of the patient's history were reviewed and updated as appropriate: allergies and current medications.    I have reviewed and agree with the HPI, ROS, and historical information as entered above. Dahlia Sharif, APRN      /78   Wt 110 kg (243 lb 6.4 oz)   LMP 2024   BMI 38.12 kg/m²       EXAM:     Prenatal Vitals  BP: 122/78  Weight: 110 kg (243 lb 6.4 oz)   Fetal Heart Rate: 158               Urine Glucose Read-only: Negative  Urine Protein Read-only: Negative           Assessment and Plan    Problem List Items Addressed This Visit          Cardiac and Vasculature    History of chronic hypertension    Overview     Resolved after weight loss 2-3 yrs ago. Was on 2 different meds. But normal since then   Baby asa            Endocrine and Metabolic    Hypothyroidism due to Hashimoto's thyroiditis    Overview     TSH 6 prior to nob, but not taking synthroid. Refer endocrine.          Relevant Medications    Tirosint 75 MCG capsule     Other Visit Diagnoses       Prenatal care, antepartum    -  Primary    Relevant Orders    POC Urinalysis Dipstick (Completed)             Pregnancy at 35w2d  Fetal status reassuring. Ultrasound today showed , cephalic, af normal, HC 1%, AC 13%, Femur 39% BPD 34% 5 lb 3 oz  Activity and Exercise discussed.  Fetal movement/PTL or Labor precautions  Patient is on Prenatal vitamins  Reviewed Pre-eclampsia signs/symptoms  GBS next visit  Patient had previous thyroid labs drawn and was notified multiple times to discuss with her endocrinologist however she has not, encouraged to do so today in case a change in dose needs to be made. VU  Follow up in one week with GBS    Dahlia Sharif, APRN  12/31/2024

## 2025-01-02 ENCOUNTER — PATIENT OUTREACH (OUTPATIENT)
Dept: LABOR AND DELIVERY | Facility: HOSPITAL | Age: 25
End: 2025-01-02
Payer: MEDICAID

## 2025-01-02 ENCOUNTER — TELEPHONE (OUTPATIENT)
Dept: OBSTETRICS AND GYNECOLOGY | Facility: CLINIC | Age: 25
End: 2025-01-02

## 2025-01-02 NOTE — OUTREACH NOTE
"[facility]  Check-In    Current Estimated Gestational Age: 35w4d      Questions/Answers      Flowsheet Row Responses   Best Method for Contacting Cell   Demographics Reviewed No   Currently Employed Yes   Able to keep appointments as scheduled Yes   Gender(s) and Name(s) Girl-Maria Eugenia Doyle   Baby Active/Feeling Fetal Movemen Yes   How are you presently feeling? \"pretty good\"   Are you having any of the following symptoms? Pressure   Education related to new diagnoses/home equipment No   Supplies ready for baby Car Seat, Clothing, Crib, Diapers, Feeding Supplies   Resource/Environmental Concerns Financial   Do you have any questions related to your care experience, your pregnancy, plans for delivery, any concerns, etc? No   Other Education Insurance benefits/Incentives, Meds to Beds          Feeling well and reports good fetal movement. Discussed what to expect when she goes in to L&D including admission process, LH and MB visitation policies, induction process, comfort measures, positioning, pain medication/Fentanyl, epidural placement, delivery, skin to skin, breastfeeding, post-delivery, and MB care. VU.    She is feeling ready for delivery. She has enrolled in [place] and feels like she has everything she will need for baby. Reports some concerns about finances in regards to being off work for recovery post delivery.  Discussed FMLA availability and standard leave lengths based on type of delivery. VU.     Updated resources provided via [employer] message. Contact information provided. Encouraged to call with questions, concerns, or for support. Will plan to see inpatient after delivery.    Beba Gusman RN  Maternity Nurse Navigator    1/2/2025, 13:56 EST          "

## 2025-01-02 NOTE — OUTREACH NOTE
[facility]  Unable to Reach    Questions/Answers      Flowsheet Row Responses   Pending Outreach Prenatal Check-in   Call Attempt First   Outcome No answer/busy, Left message   Next Call Attempt Date 01/03/25   Unable to reach comments: LVM asking for return call. Will attempt contact tomorrow.              Beba Gusman RN  Maternity Nurse Navigator    1/2/2025, 13:10 EST

## 2025-01-02 NOTE — TELEPHONE ENCOUNTER
Caller: Onel Zapata Mari     Relationship: Self     Best call back number: [phone] - CALL BACK AT 12 PM     What form or medical record are you requesting: DENTIST LETTER     Who is requesting this form or medical record from you: [facility]     How would you like to receive the form or medical records (pick-up, mail, fax): FAX  If fax, what is the fax number: [phone]     Timeframe paperwork needed: ASAP     Additional notes: PATIENT STATES SHE HAS A TOOTH THAT HAS BROKEN OFF AND THE DENTIST IS REQUESTING A LETTER OF CLEARANCE IN ORDER TO TREAT PATIENT.  PATIENT IS CURRENTLY 35 WEEKS AND 4 DAYS PREGNANT.  THE BROKEN TOOTH IS CUTTING INTO HER TONGUE.       THE DENTAL OFFICE DID NOT RECEIVE THE LETTER FROM US PATIENT WOULD LIKE FOR YOU TO FAX TO THE NEW FAX NUMBER PROVIDED AT  [phone]    THANK YOU

## 2025-01-02 NOTE — TELEPHONE ENCOUNTER
Caller: Onel Zapata Mari    Relationship: Self    Best call back number: [phone] - CALL BACK AT 12 PM    What form or medical record are you requesting: DENTIST LETTER    Who is requesting this form or medical record from you: [facility]    How would you like to receive the form or medical records (pick-up, mail, fax): FAX  If fax, what is the fax number: [phone]    Timeframe paperwork needed: ASAP    Additional notes: PATIENT STATES SHE HAS A TOOTH THAT HAS BROKEN OFF AND THE DENTIST IS REQUESTING A LETTER OF CLEARANCE IN ORDER TO TREAT PATIENT.  PATIENT IS CURRENTLY 35 WEEKS AND 4 DAYS PREGNANT.  THE BROKEN TOOTH IS CUTTING INTO HER TONGUE.

## 2025-01-14 ENCOUNTER — ROUTINE PRENATAL (OUTPATIENT)
Dept: OBSTETRICS AND GYNECOLOGY | Facility: CLINIC | Age: 25
End: 2025-01-14
Payer: MEDICAID

## 2025-01-14 ENCOUNTER — LAB (OUTPATIENT)
Dept: LAB | Facility: HOSPITAL | Age: 25
End: 2025-01-14
Payer: MEDICAID

## 2025-01-14 VITALS — SYSTOLIC BLOOD PRESSURE: 116 MMHG | DIASTOLIC BLOOD PRESSURE: 78 MMHG | WEIGHT: 250.8 LBS | BODY MASS INDEX: 39.28 KG/M2

## 2025-01-14 DIAGNOSIS — Z34.93 PRENATAL CARE IN THIRD TRIMESTER: Primary | ICD-10-CM

## 2025-01-14 DIAGNOSIS — E06.3 HYPOTHYROIDISM DUE TO HASHIMOTO'S THYROIDITIS: ICD-10-CM

## 2025-01-14 DIAGNOSIS — R82.71 GBS BACTERIURIA: ICD-10-CM

## 2025-01-14 DIAGNOSIS — Z86.79 HISTORY OF CHRONIC HYPERTENSION: ICD-10-CM

## 2025-01-14 DIAGNOSIS — O99.330 TOBACCO USE DURING PREGNANCY, ANTEPARTUM: ICD-10-CM

## 2025-01-14 DIAGNOSIS — Z3A.37 37 WEEKS GESTATION OF PREGNANCY: ICD-10-CM

## 2025-01-14 LAB
GLUCOSE UR STRIP-MCNC: NEGATIVE MG/DL
PROT UR STRIP-MCNC: NEGATIVE MG/DL

## 2025-01-14 PROCEDURE — 87081 CULTURE SCREEN ONLY: CPT

## 2025-01-14 NOTE — PROGRESS NOTES
"    OB FOLLOW UP  CC- Here for care of pregnancy        Onel Zapata is a 24 y.o.  37w2d patient being seen today for her obstetrical follow up visit. Patient reports swelling in lower extremities without pitting.     Her prenatal care is complicated by (and status) : see below.  Patient Active Problem List   Diagnosis    Hypothyroidism due to Hashimoto's thyroiditis    Pregnancy    Tobacco use during pregnancy, antepartum    Marijuana use during pregnancy    History of chronic hypertension    GBS bacteriuria       GBS Status: No results found for: \"STREPGPB\"      Allergies   Allergen Reactions    Augmentin [Amoxicillin-Pot Clavulanate] Rash          Flu Status: Declines  Her Delivery Plan is: Undecided    US today: no  Non Stress Test: No.    ROS -   Patient Denies: Loss of Fluid, Vaginal Spotting, Vision Changes, Headaches, Contractions, Epigastric pain, and skin itching  Fetal Movement : normal  All other systems reviewed and are negative.       The additional following portions of the patient's history were reviewed and updated as appropriate: allergies, current medications, past family history, past medical history, past social history, past surgical history, and problem list.    I have reviewed and agree with the HPI, ROS, and historical information as entered above. Edilia Munoz MD        EXAM:     Prenatal Vitals  BP: 116/78  Weight: 114 kg (250 lb 12.8 oz)   Fetal Heart Rate: +       Dilation/Effacement/Station  Dilation: 1  Effacement (%): 70      Urine Glucose Read-only: Negative  Urine Protein Read-only: Negative           Assessment and Plan    Problem List Items Addressed This Visit       Hypothyroidism due to Hashimoto's thyroiditis    Overview     TSH 6 prior to nob, but not taking synthroid. Refer endocrine.          Relevant Medications    Tirosint 75 MCG capsule    Pregnancy    Overview     cfDNA low risk         Tobacco use during pregnancy, antepartum    History of chronic " hypertension    Overview     Resolved after weight loss 2-3 yrs ago. Was on 2 different meds. But normal since then   Baby asa         GBS bacteriuria     Other Visit Diagnoses       Prenatal care in third trimester    -  Primary    Relevant Orders    Group B Streptococcus Culture - Swab, Vaginal/Rectum    TSH+Free T4    CBC (No Diff)    POC Urinalysis Dipstick (Completed)            Pregnancy at 37w2d  Fetal status reassuring.   Reviewed Pre-eclampsia signs/symptoms  Discussed IOL options with patient. Pt. desires IOL after 40 weeks.  Will sched.   Delivery options reviewed with patient  Signs of labor reviewed  Kick counts reviewed  Activity and Exercise discussed.  Return in about 1 week (around 1/21/2025) for F/U Prenatal.    Edilia Munoz MD  01/14/2025

## 2025-01-17 LAB — BACTERIA SPEC AEROBE CULT: NORMAL

## 2025-01-21 ENCOUNTER — ROUTINE PRENATAL (OUTPATIENT)
Dept: OBSTETRICS AND GYNECOLOGY | Facility: CLINIC | Age: 25
End: 2025-01-21
Payer: MEDICAID

## 2025-01-21 VITALS — BODY MASS INDEX: 39.25 KG/M2 | WEIGHT: 250.6 LBS | SYSTOLIC BLOOD PRESSURE: 124 MMHG | DIASTOLIC BLOOD PRESSURE: 72 MMHG

## 2025-01-21 DIAGNOSIS — Z34.90 PRENATAL CARE, ANTEPARTUM: Primary | ICD-10-CM

## 2025-01-21 DIAGNOSIS — R82.71 GBS BACTERIURIA: ICD-10-CM

## 2025-01-21 DIAGNOSIS — Z86.79 HISTORY OF CHRONIC HYPERTENSION: ICD-10-CM

## 2025-01-21 DIAGNOSIS — Z3A.38 38 WEEKS GESTATION OF PREGNANCY: ICD-10-CM

## 2025-01-21 DIAGNOSIS — O99.330 TOBACCO USE DURING PREGNANCY, ANTEPARTUM: ICD-10-CM

## 2025-01-21 DIAGNOSIS — E06.3 HYPOTHYROIDISM DUE TO HASHIMOTO'S THYROIDITIS: ICD-10-CM

## 2025-01-21 LAB
GLUCOSE UR STRIP-MCNC: NEGATIVE MG/DL
PROT UR STRIP-MCNC: NEGATIVE MG/DL

## 2025-01-21 NOTE — PROGRESS NOTES
OB FOLLOW UP  CC- Here for care of pregnancy        Onel Zapata is a 24 y.o.  38w2d patient being seen today for her obstetrical follow up visit. Patient reports occasional trace swelling in BLE.         Her prenatal care is complicated by (and status) : see below.  Patient Active Problem List   Diagnosis    Hypothyroidism due to Hashimoto's thyroiditis    Pregnancy    Tobacco use during pregnancy, antepartum    Marijuana use during pregnancy    History of chronic hypertension    GBS bacteriuria       GBS Status:   Group B Strep Culture   Date Value Ref Range Status   2025 No Group B Streptococcus Isolated at 2 days  Final         Allergies   Allergen Reactions    Augmentin [Amoxicillin-Pot Clavulanate] Rash          Flu Status: Declines  Her Delivery Plan is:  IOL Scheduled 2025 at 12am      US today: no  Non Stress Test: No.    ROS -   Patient Denies: Loss of Fluid, Vaginal Spotting, Vision Changes, Headaches, Nausea , Vomiting , Contractions, Epigastric pain, and skin itching  Fetal Movement : normal  All other systems reviewed and are negative.       The additional following portions of the patient's history were reviewed and updated as appropriate: allergies and current medications.    I have reviewed and agree with the HPI, ROS, and historical information as entered above. Edilia Munoz MD        EXAM:     Prenatal Vitals  BP: 124/72  Weight: 114 kg (250 lb 9.6 oz)   Fetal Heart Rate: +       Dilation/Effacement/Station  Dilation: 1  Effacement (%): 70      Urine Glucose Read-only: Negative  Urine Protein Read-only: Negative           Assessment and Plan    Problem List Items Addressed This Visit       Hypothyroidism due to Hashimoto's thyroiditis    Overview     TSH 6 prior to nob, but not taking synthroid. Refer endocrine.          Relevant Medications    Tirosint 75 MCG capsule    Pregnancy    Overview     cfDNA low risk         Tobacco use during pregnancy, antepartum     History of chronic hypertension    Overview     Resolved after weight loss 2-3 yrs ago. Was on 2 different meds. But normal since then   Baby asa         GBS bacteriuria     Other Visit Diagnoses       Prenatal care, antepartum    -  Primary    Relevant Orders    POC Urinalysis Dipstick (Completed)            Pregnancy at 38w2d  Fetal status reassuring.   Reviewed Pre-eclampsia signs/symptoms  Delivery options reviewed with patient  Signs of labor reviewed  Kick counts reviewed  Activity and Exercise discussed.  Return in about 1 week (around 1/28/2025) for F/U Prenatal.    Edilia Munoz MD  01/21/2025

## 2025-01-28 ENCOUNTER — ROUTINE PRENATAL (OUTPATIENT)
Dept: OBSTETRICS AND GYNECOLOGY | Facility: CLINIC | Age: 25
End: 2025-01-28
Payer: MEDICAID

## 2025-01-28 VITALS — BODY MASS INDEX: 39.66 KG/M2 | SYSTOLIC BLOOD PRESSURE: 122 MMHG | WEIGHT: 253.2 LBS | DIASTOLIC BLOOD PRESSURE: 92 MMHG

## 2025-01-28 DIAGNOSIS — E06.3 HYPOTHYROIDISM DUE TO HASHIMOTO'S THYROIDITIS: ICD-10-CM

## 2025-01-28 DIAGNOSIS — Z34.93 PRENATAL CARE IN THIRD TRIMESTER: Primary | ICD-10-CM

## 2025-01-28 DIAGNOSIS — O99.330 TOBACCO USE DURING PREGNANCY, ANTEPARTUM: ICD-10-CM

## 2025-01-28 DIAGNOSIS — R82.71 GBS BACTERIURIA: ICD-10-CM

## 2025-01-28 DIAGNOSIS — Z3A.39 39 WEEKS GESTATION OF PREGNANCY: ICD-10-CM

## 2025-01-28 LAB
GLUCOSE UR STRIP-MCNC: NEGATIVE MG/DL
PROT UR STRIP-MCNC: NEGATIVE MG/DL

## 2025-01-28 NOTE — PROGRESS NOTES
OB FOLLOW UP  CC- Here for care of pregnancy        Onel Zapata is a 24 y.o.  39w2d patient being seen today for her obstetrical follow up visit. Patient reports having a fever this past Friday and congestion. She went to [facility] in [place]. She tested negative for flu, covid, and strep. She also reports intermittent contractions.      Her prenatal care is complicated by (and status) : see below.  Patient Active Problem List   Diagnosis    Hypothyroidism due to Hashimoto's thyroiditis    Pregnancy    Tobacco use during pregnancy, antepartum    Marijuana use during pregnancy    History of chronic hypertension    GBS bacteriuria       GBS Status:   Group B Strep Culture   Date Value Ref Range Status   2025 No Group B Streptococcus Isolated at 2 days  Final         Allergies   Allergen Reactions    Augmentin [Amoxicillin-Pot Clavulanate] Rash          Flu Status: Declines  Her Delivery Plan is: Desires IOL after 40wks. Scheduled    US today: no  Non Stress Test: No.    ROS -   Patient Denies: Loss of Fluid, Vaginal Spotting, Vision Changes, Epigastric pain, and skin itching  Fetal Movement : normal  All other systems reviewed and are negative.       The additional following portions of the patient's history were reviewed and updated as appropriate: allergies, current medications, past family history, past medical history, past social history, past surgical history, and problem list.    I have reviewed and agree with the HPI, ROS, and historical information as entered above. Edilia Munoz MD        EXAM:     Prenatal Vitals  BP: 122/92  Weight: 115 kg (253 lb 3.2 oz)   Fetal Heart Rate: +       Dilation/Effacement/Station  Dilation: 2  Effacement (%): 70      Urine Glucose Read-only: Negative  Urine Protein Read-only: Negative           Assessment and Plan    Problem List Items Addressed This Visit       Hypothyroidism due to Hashimoto's thyroiditis    Overview     TSH 6 prior to nob, but not  taking synthroid. Refer endocrine.          Relevant Medications    Tirosint 75 MCG capsule    Pregnancy    Overview     cfDNA low risk         Tobacco use during pregnancy, antepartum    GBS bacteriuria     Other Visit Diagnoses       Prenatal care in third trimester    -  Primary    Relevant Orders    POC Urinalysis Dipstick (Completed)            Pregnancy at 39w2d  Fetal status reassuring.   Reviewed Pre-eclampsia signs/symptoms  Delivery options reviewed with patient  Signs of labor reviewed  Kick counts reviewed  Activity and Exercise discussed.  Return in about 1 week (around 2/4/2025) for F/U Prenatal.    Edilia Munoz MD  01/28/2025

## 2025-01-31 ENCOUNTER — ROUTINE PRENATAL (OUTPATIENT)
Dept: OBSTETRICS AND GYNECOLOGY | Facility: CLINIC | Age: 25
End: 2025-01-31
Payer: MEDICAID

## 2025-01-31 ENCOUNTER — HOSPITAL ENCOUNTER (INPATIENT)
Facility: HOSPITAL | Age: 25
LOS: 3 days | Discharge: HOME | End: 2025-02-03
Attending: OBSTETRICS & GYNECOLOGY | Admitting: OBSTETRICS & GYNECOLOGY
Payer: MEDICAID

## 2025-01-31 ENCOUNTER — TELEPHONE (OUTPATIENT)
Dept: OBSTETRICS AND GYNECOLOGY | Facility: CLINIC | Age: 25
End: 2025-01-31
Payer: MEDICAID

## 2025-01-31 VITALS — BODY MASS INDEX: 39.31 KG/M2 | WEIGHT: 251 LBS | SYSTOLIC BLOOD PRESSURE: 142 MMHG | DIASTOLIC BLOOD PRESSURE: 88 MMHG

## 2025-01-31 DIAGNOSIS — Z86.79 HISTORY OF CHRONIC HYPERTENSION: ICD-10-CM

## 2025-01-31 DIAGNOSIS — O99.330 TOBACCO USE DURING PREGNANCY, ANTEPARTUM: ICD-10-CM

## 2025-01-31 DIAGNOSIS — E06.3 HYPOTHYROIDISM DUE TO HASHIMOTO'S THYROIDITIS: ICD-10-CM

## 2025-01-31 DIAGNOSIS — O26.899 CRAMPING COMPLICATING PREGNANCY, ANTEPARTUM: Primary | ICD-10-CM

## 2025-01-31 DIAGNOSIS — R10.9 CRAMPING COMPLICATING PREGNANCY, ANTEPARTUM: Primary | ICD-10-CM

## 2025-01-31 PROBLEM — Z34.93 THIRD TRIMESTER PREGNANCY: Status: ACTIVE | Noted: 2025-01-31

## 2025-01-31 PROBLEM — Z37.9 NORMAL LABOR: Status: ACTIVE | Noted: 2025-01-31

## 2025-01-31 LAB
ABO GROUP BLD: NORMAL
ABO GROUP BLD: NORMAL
ALP SERPL-CCNC: 128 U/L (ref 39–117)
ALT SERPL W P-5'-P-CCNC: 14 U/L (ref 1–33)
AST SERPL-CCNC: 19 U/L (ref 1–32)
BILIRUB BLD-MCNC: NEGATIVE MG/DL
BILIRUB SERPL-MCNC: 0.3 MG/DL (ref 0–1.2)
BLD GP AB SCN SERPL QL: NEGATIVE
CLARITY, POC: CLEAR
COLOR UR: YELLOW
CREAT SERPL-MCNC: 0.47 MG/DL (ref 0.57–1)
DEPRECATED RDW RBC AUTO: 40.3 FL (ref 37–54)
ERYTHROCYTE [DISTWIDTH] IN BLOOD BY AUTOMATED COUNT: 12.9 % (ref 12.3–15.4)
GLUCOSE UR STRIP-MCNC: NEGATIVE MG/DL
HCT VFR BLD AUTO: 40 % (ref 34–46.6)
HGB BLD-MCNC: 13.4 G/DL (ref 12–15.9)
KETONES UR QL: NEGATIVE
LDH SERPL-CCNC: 153 U/L (ref 135–214)
LEUKOCYTE EST, POC: NEGATIVE
MCH RBC QN AUTO: 29.2 PG (ref 26.6–33)
MCHC RBC AUTO-ENTMCNC: 33.5 G/DL (ref 31.5–35.7)
MCV RBC AUTO: 87.1 FL (ref 79–97)
NITRITE UR-MCNC: NEGATIVE MG/ML
PH UR: 7.5 [PH] (ref 5–8)
PLATELET # BLD AUTO: 223 10*3/MM3 (ref 140–450)
PMV BLD AUTO: 10.4 FL (ref 6–12)
PROT UR STRIP-MCNC: NEGATIVE MG/DL
RBC # BLD AUTO: 4.59 10*6/MM3 (ref 3.77–5.28)
RBC # UR STRIP: ABNORMAL /UL
RH BLD: POSITIVE
RH BLD: POSITIVE
SP GR UR: 1.01 (ref 1–1.03)
T&S EXPIRATION DATE: NORMAL
TREPONEMA PALLIDUM IGG+IGM AB [PRESENCE] IN SERUM OR PLASMA BY IMMUNOASSAY: NORMAL
URATE SERPL-MCNC: 4.6 MG/DL (ref 2.4–5.7)
UROBILINOGEN UR QL: NORMAL
WBC NRBC COR # BLD AUTO: 11.22 10*3/MM3 (ref 3.4–10.8)

## 2025-01-31 PROCEDURE — 82247 BILIRUBIN TOTAL: CPT | Performed by: NURSE PRACTITIONER

## 2025-01-31 PROCEDURE — 84460 ALANINE AMINO (ALT) (SGPT): CPT | Performed by: NURSE PRACTITIONER

## 2025-01-31 PROCEDURE — 25810000003 LACTATED RINGERS PER 1000 ML: Performed by: OBSTETRICS & GYNECOLOGY

## 2025-01-31 PROCEDURE — 84450 TRANSFERASE (AST) (SGOT): CPT | Performed by: NURSE PRACTITIONER

## 2025-01-31 PROCEDURE — 86780 TREPONEMA PALLIDUM: CPT | Performed by: NURSE PRACTITIONER

## 2025-01-31 PROCEDURE — 86901 BLOOD TYPING SEROLOGIC RH(D): CPT

## 2025-01-31 PROCEDURE — 25010000002 MORPHINE PER 10 MG: Performed by: OBSTETRICS & GYNECOLOGY

## 2025-01-31 PROCEDURE — 85027 COMPLETE CBC AUTOMATED: CPT | Performed by: NURSE PRACTITIONER

## 2025-01-31 PROCEDURE — 86850 RBC ANTIBODY SCREEN: CPT | Performed by: NURSE PRACTITIONER

## 2025-01-31 PROCEDURE — 83615 LACTATE (LD) (LDH) ENZYME: CPT | Performed by: NURSE PRACTITIONER

## 2025-01-31 PROCEDURE — 25010000002 BUTORPHANOL PER 1 MG: Performed by: OBSTETRICS & GYNECOLOGY

## 2025-01-31 PROCEDURE — 84075 ASSAY ALKALINE PHOSPHATASE: CPT | Performed by: NURSE PRACTITIONER

## 2025-01-31 PROCEDURE — 86900 BLOOD TYPING SEROLOGIC ABO: CPT

## 2025-01-31 PROCEDURE — 25010000002 CEFAZOLIN PER 500 MG: Performed by: OBSTETRICS & GYNECOLOGY

## 2025-01-31 PROCEDURE — 84550 ASSAY OF BLOOD/URIC ACID: CPT | Performed by: NURSE PRACTITIONER

## 2025-01-31 PROCEDURE — 86901 BLOOD TYPING SEROLOGIC RH(D): CPT | Performed by: NURSE PRACTITIONER

## 2025-01-31 PROCEDURE — 86900 BLOOD TYPING SEROLOGIC ABO: CPT | Performed by: NURSE PRACTITIONER

## 2025-01-31 PROCEDURE — 82565 ASSAY OF CREATININE: CPT | Performed by: NURSE PRACTITIONER

## 2025-01-31 RX ORDER — ONDANSETRON 2 MG/ML
4 INJECTION INTRAMUSCULAR; INTRAVENOUS EVERY 6 HOURS PRN
Status: DISCONTINUED | OUTPATIENT
Start: 2025-01-31 | End: 2025-02-01

## 2025-01-31 RX ORDER — ACETAMINOPHEN 325 MG/1
650 TABLET ORAL EVERY 4 HOURS PRN
Status: DISCONTINUED | OUTPATIENT
Start: 2025-01-31 | End: 2025-02-01 | Stop reason: HOSPADM

## 2025-01-31 RX ORDER — SODIUM CHLORIDE, SODIUM LACTATE, POTASSIUM CHLORIDE, CALCIUM CHLORIDE 600; 310; 30; 20 MG/100ML; MG/100ML; MG/100ML; MG/100ML
125 INJECTION, SOLUTION INTRAVENOUS CONTINUOUS
Status: DISCONTINUED | OUTPATIENT
Start: 2025-01-31 | End: 2025-02-01

## 2025-01-31 RX ORDER — SODIUM CHLORIDE 9 MG/ML
40 INJECTION, SOLUTION INTRAVENOUS AS NEEDED
Status: DISCONTINUED | OUTPATIENT
Start: 2025-01-31 | End: 2025-02-01 | Stop reason: HOSPADM

## 2025-01-31 RX ORDER — BUTORPHANOL TARTRATE 2 MG/ML
2 INJECTION, SOLUTION INTRAMUSCULAR; INTRAVENOUS
Status: DISCONTINUED | OUTPATIENT
Start: 2025-01-31 | End: 2025-02-01

## 2025-01-31 RX ORDER — LIDOCAINE HYDROCHLORIDE 10 MG/ML
0.5 INJECTION, SOLUTION EPIDURAL; INFILTRATION; INTRACAUDAL; PERINEURAL ONCE AS NEEDED
Status: DISCONTINUED | OUTPATIENT
Start: 2025-01-31 | End: 2025-02-01 | Stop reason: HOSPADM

## 2025-01-31 RX ORDER — MAGNESIUM CARB/ALUMINUM HYDROX 105-160MG
30 TABLET,CHEWABLE ORAL ONCE
Status: DISCONTINUED | OUTPATIENT
Start: 2025-01-31 | End: 2025-02-01 | Stop reason: HOSPADM

## 2025-01-31 RX ORDER — OXYTOCIN/0.9 % SODIUM CHLORIDE 30/500 ML
2-20 PLASTIC BAG, INJECTION (ML) INTRAVENOUS
Status: DISCONTINUED | OUTPATIENT
Start: 2025-02-01 | End: 2025-02-01

## 2025-01-31 RX ORDER — ONDANSETRON 2 MG/ML
4 INJECTION INTRAMUSCULAR; INTRAVENOUS EVERY 6 HOURS PRN
Status: DISCONTINUED | OUTPATIENT
Start: 2025-01-31 | End: 2025-02-01 | Stop reason: HOSPADM

## 2025-01-31 RX ORDER — MORPHINE SULFATE 2 MG/ML
2 INJECTION, SOLUTION INTRAMUSCULAR; INTRAVENOUS
Status: DISCONTINUED | OUTPATIENT
Start: 2025-01-31 | End: 2025-02-01

## 2025-01-31 RX ORDER — SODIUM CHLORIDE 0.9 % (FLUSH) 0.9 %
10 SYRINGE (ML) INJECTION AS NEEDED
Status: DISCONTINUED | OUTPATIENT
Start: 2025-01-31 | End: 2025-02-01 | Stop reason: HOSPADM

## 2025-01-31 RX ORDER — ONDANSETRON 4 MG/1
4 TABLET, ORALLY DISINTEGRATING ORAL EVERY 6 HOURS PRN
Status: DISCONTINUED | OUTPATIENT
Start: 2025-01-31 | End: 2025-02-01 | Stop reason: HOSPADM

## 2025-01-31 RX ORDER — SODIUM CHLORIDE, SODIUM LACTATE, POTASSIUM CHLORIDE, CALCIUM CHLORIDE 600; 310; 30; 20 MG/100ML; MG/100ML; MG/100ML; MG/100ML
125 INJECTION, SOLUTION INTRAVENOUS CONTINUOUS
Status: ACTIVE | OUTPATIENT
Start: 2025-01-31 | End: 2025-01-31

## 2025-01-31 RX ORDER — SODIUM CHLORIDE 0.9 % (FLUSH) 0.9 %
10 SYRINGE (ML) INJECTION EVERY 12 HOURS SCHEDULED
Status: DISCONTINUED | OUTPATIENT
Start: 2025-01-31 | End: 2025-02-01 | Stop reason: HOSPADM

## 2025-01-31 RX ADMIN — SODIUM CHLORIDE, POTASSIUM CHLORIDE, SODIUM LACTATE AND CALCIUM CHLORIDE 125 ML/HR: 600; 310; 30; 20 INJECTION, SOLUTION INTRAVENOUS at 17:19

## 2025-01-31 RX ADMIN — BUTORPHANOL TARTRATE 2 MG: 2 INJECTION, SOLUTION INTRAMUSCULAR; INTRAVENOUS at 19:53

## 2025-01-31 RX ADMIN — MORPHINE SULFATE 2 MG: 2 INJECTION, SOLUTION INTRAMUSCULAR; INTRAVENOUS at 17:19

## 2025-01-31 RX ADMIN — SODIUM CHLORIDE 2000 MG: 900 INJECTION INTRAVENOUS at 23:14

## 2025-01-31 RX ADMIN — SODIUM CHLORIDE, POTASSIUM CHLORIDE, SODIUM LACTATE AND CALCIUM CHLORIDE 125 ML/HR: 600; 310; 30; 20 INJECTION, SOLUTION INTRAVENOUS at 20:08

## 2025-01-31 NOTE — TELEPHONE ENCOUNTER
39w5d  KS    Pt states KS did a membrane sweep at her last appt and she has been having some period like cramping when she is standing since but when she sits down it stops. She states this morning around 3am she woke up and was wet but not soaked through her clothes. She states pink spotting began around 7 this morning and she wanted to know if this was normal. I spoke with RG and recommended pt come in for evaluation today. She VU

## 2025-01-31 NOTE — H&P
Subjective  Onel Mari Zapata is a 24 y.o.  39w5d patient being seen today for contractions and vaginal spotting. Patient states that she had a membrane sweep on 2025 with KS. Patient lost her mucous plug later that night. Patient states that she was woken up at 3AM today and her underwear felt damp. Patient states that she has since been having cramping, contractions, and vaginal spotting. Patient provided photos. Patient states that she feels her contractions in her lower abdomen and in her back.      Patient has not been able to leave a urine sample      Her prenatal care is complicated by (and status) :    Problem List       Patient Active Problem List   Diagnosis    Hypothyroidism due to Hashimoto's thyroiditis    Pregnancy    Tobacco use during pregnancy, antepartum    Marijuana use during pregnancy    History of chronic hypertension    GBS bacteriuria            Ultrasound Today: No.     ROS -   Patient Denies: Loss of Fluid, Vision Changes, Headaches, Nausea , Vomiting , Epigastric pain, and skin itching  Fetal Movement : normal  All other systems reviewed and are negative.         The additional following portions of the patient's history were reviewed and updated as appropriate: allergies and current medications.     I have reviewed and agree with the HPI, ROS, and historical information as entered above. Dorinda Benson, APRN        /88   Wt 114 kg (251 lb)   LMP 2024   BMI 39.31 kg/m²         EXAM:      Prenatal Vitals  BP: 142/88  Weight: 114 kg (251 lb)   Fetal Heart Rate: NST   Dilation/Effacement/Station  Dilation: 3  Effacement (%): 80  Station: -2           Assessment  Assessment and Plan     Problem List Items Addressed This Visit                  Cardiac and Vasculature     History of chronic hypertension     Overview       Resolved after weight loss 2-3 yrs ago. Was on 2 different meds. But normal since then   Baby asa                Endocrine and Metabolic      Hypothyroidism due to Hashimoto's thyroiditis     Overview       TSH 6 prior to nob, but not taking synthroid. Refer endocrine.            Relevant Medications     Tirosint 75 MCG capsule          Tobacco     Tobacco use during pregnancy, antepartum      Other Visit Diagnoses         Cramping complicating pregnancy, antepartum    -  Primary     Relevant Orders     POC Urinalysis Dipstick (Completed)     Urine Culture - Urine, Urine, Clean Catch                Pregnancy at 39w5d  Fetal status reassuring.   Since she has been in the office, her cervix has changed from 2-3 cm.  She is breathing through contractions.  Fundus feels firm although no ctxs noted on reactive NST.    To      Dorinda Benson, APRN  01/31/2025

## 2025-01-31 NOTE — TELEPHONE ENCOUNTER
Hub staff attempted to follow warm transfer process and was unsuccessful     Caller: Onel Zapata    Relationship to patient: Self    Best call back number: [phone]    Patient is needing: MEMBRANE SWEEP ON TUESDAY AND PATIENT IS SPOTTING LIGHT PINK TODAY. PATIENT IS WANTING TO KNOW IF SHE NEEDS TO BE SEEN?

## 2025-01-31 NOTE — PROGRESS NOTES
CC- contractions and vaginal spotting       Onel Zapata is a 24 y.o.  39w5d patient being seen today for contractions and vaginal spotting. Patient states that she had a membrane sweep on 2025 with KS. Patient lost her mucous plug later that night. Patient states that she was woken up at 3AM today and her underwear felt damp. Patient states that she has since been having cramping, contractions, and vaginal spotting. Patient provided photos. Patient states that she feels her contractions in her lower abdomen and in her back.     Patient has not been able to leave a urine sample     Her prenatal care is complicated by (and status) :    Patient Active Problem List   Diagnosis    Hypothyroidism due to Hashimoto's thyroiditis    Pregnancy    Tobacco use during pregnancy, antepartum    Marijuana use during pregnancy    History of chronic hypertension    GBS bacteriuria       Ultrasound Today: No.    ROS -   Patient Denies: Loss of Fluid, Vision Changes, Headaches, Nausea , Vomiting , Epigastric pain, and skin itching  Fetal Movement : normal  All other systems reviewed and are negative.       The additional following portions of the patient's history were reviewed and updated as appropriate: allergies and current medications.    I have reviewed and agree with the HPI, ROS, and historical information as entered above. Dorinda Benson, APRN      /88   Wt 114 kg (251 lb)   LMP 2024   BMI 39.31 kg/m²       EXAM:     Prenatal Vitals  BP: 142/88  Weight: 114 kg (251 lb)   Fetal Heart Rate: NST   Dilation/Effacement/Station  Dilation: 3  Effacement (%): 80  Station: -2            Assessment and Plan    Problem List Items Addressed This Visit          Cardiac and Vasculature    History of chronic hypertension    Overview     Resolved after weight loss 2-3 yrs ago. Was on 2 different meds. But normal since then   Baby asa            Endocrine and Metabolic    Hypothyroidism due to  Hashimoto's thyroiditis    Overview     TSH 6 prior to nob, but not taking synthroid. Refer endocrine.          Relevant Medications    Tirosint 75 MCG capsule       Tobacco    Tobacco use during pregnancy, antepartum     Other Visit Diagnoses       Cramping complicating pregnancy, antepartum    -  Primary    Relevant Orders    POC Urinalysis Dipstick (Completed)    Urine Culture - Urine, Urine, Clean Catch            Pregnancy at 39w5d  Fetal status reassuring.   Since she has been in the office, her cervix has changed from 2-3 cm.  She is breathing through contractions.  Fundus feels firm although no ctxs noted on reactive NST.    To ELIEZER Benson, APRN  01/31/2025

## 2025-02-01 ENCOUNTER — ANESTHESIA EVENT (OUTPATIENT)
Dept: LABOR AND DELIVERY | Facility: HOSPITAL | Age: 25
End: 2025-02-01
Payer: MEDICAID

## 2025-02-01 ENCOUNTER — ANESTHESIA (OUTPATIENT)
Dept: LABOR AND DELIVERY | Facility: HOSPITAL | Age: 25
End: 2025-02-01
Payer: MEDICAID

## 2025-02-01 PROBLEM — Z37.9 NORMAL LABOR: Status: RESOLVED | Noted: 2025-01-31 | Resolved: 2025-02-01

## 2025-02-01 PROBLEM — Z34.93 THIRD TRIMESTER PREGNANCY: Status: RESOLVED | Noted: 2025-01-31 | Resolved: 2025-02-01

## 2025-02-01 PROCEDURE — 10907ZC DRAINAGE OF AMNIOTIC FLUID, THERAPEUTIC FROM PRODUCTS OF CONCEPTION, VIA NATURAL OR ARTIFICIAL OPENING: ICD-10-PCS | Performed by: STUDENT IN AN ORGANIZED HEALTH CARE EDUCATION/TRAINING PROGRAM

## 2025-02-01 PROCEDURE — 25010000002 FENTANYL CITRATE (PF) 50 MCG/ML SOLUTION: Performed by: ANESTHESIOLOGY

## 2025-02-01 PROCEDURE — 25010000002 BUTORPHANOL PER 1 MG: Performed by: OBSTETRICS & GYNECOLOGY

## 2025-02-01 PROCEDURE — 25810000003 LACTATED RINGERS SOLUTION: Performed by: OBSTETRICS & GYNECOLOGY

## 2025-02-01 PROCEDURE — 0KQM0ZZ REPAIR PERINEUM MUSCLE, OPEN APPROACH: ICD-10-PCS | Performed by: STUDENT IN AN ORGANIZED HEALTH CARE EDUCATION/TRAINING PROGRAM

## 2025-02-01 PROCEDURE — 25010000002 ONDANSETRON PER 1 MG: Performed by: OBSTETRICS & GYNECOLOGY

## 2025-02-01 PROCEDURE — 25010000002 ROPIVACAINE PER 1 MG: Performed by: ANESTHESIOLOGY

## 2025-02-01 PROCEDURE — C1755 CATHETER, INTRASPINAL: HCPCS | Performed by: ANESTHESIOLOGY

## 2025-02-01 PROCEDURE — 25010000002 LIDOCAINE-EPINEPHRINE (PF) 1.5 %-1:200000 SOLUTION: Performed by: ANESTHESIOLOGY

## 2025-02-01 RX ORDER — FENTANYL CITRATE 50 UG/ML
INJECTION, SOLUTION INTRAMUSCULAR; INTRAVENOUS AS NEEDED
Status: DISCONTINUED | OUTPATIENT
Start: 2025-02-01 | End: 2025-02-01 | Stop reason: SURG

## 2025-02-01 RX ORDER — EPHEDRINE SULFATE 5 MG/ML
10 INJECTION INTRAVENOUS
Status: DISCONTINUED | OUTPATIENT
Start: 2025-02-01 | End: 2025-02-01 | Stop reason: HOSPADM

## 2025-02-01 RX ORDER — OXYTOCIN/0.9 % SODIUM CHLORIDE 30/500 ML
999 PLASTIC BAG, INJECTION (ML) INTRAVENOUS ONCE
Status: COMPLETED | OUTPATIENT
Start: 2025-02-01 | End: 2025-02-01

## 2025-02-01 RX ORDER — DIPHENHYDRAMINE HCL 25 MG
25 CAPSULE ORAL NIGHTLY PRN
Status: DISCONTINUED | OUTPATIENT
Start: 2025-02-01 | End: 2025-02-03 | Stop reason: HOSPADM

## 2025-02-01 RX ORDER — FAMOTIDINE 20 MG/1
20 TABLET, FILM COATED ORAL 2 TIMES DAILY
Status: DISCONTINUED | OUTPATIENT
Start: 2025-02-01 | End: 2025-02-03 | Stop reason: HOSPADM

## 2025-02-01 RX ORDER — OXYTOCIN/0.9 % SODIUM CHLORIDE 30/500 ML
250 PLASTIC BAG, INJECTION (ML) INTRAVENOUS CONTINUOUS
Status: ACTIVE | OUTPATIENT
Start: 2025-02-01 | End: 2025-02-01

## 2025-02-01 RX ORDER — ROPIVACAINE HYDROCHLORIDE 5 MG/ML
INJECTION, SOLUTION EPIDURAL; INFILTRATION; PERINEURAL AS NEEDED
Status: DISCONTINUED | OUTPATIENT
Start: 2025-02-01 | End: 2025-02-01 | Stop reason: SURG

## 2025-02-01 RX ORDER — ROPIVACAINE HYDROCHLORIDE 2 MG/ML
15 INJECTION, SOLUTION EPIDURAL; INFILTRATION; PERINEURAL CONTINUOUS
Status: DISCONTINUED | OUTPATIENT
Start: 2025-02-01 | End: 2025-02-01

## 2025-02-01 RX ORDER — HYDROCORTISONE 25 MG/G
1 CREAM TOPICAL AS NEEDED
Status: DISCONTINUED | OUTPATIENT
Start: 2025-02-01 | End: 2025-02-03 | Stop reason: HOSPADM

## 2025-02-01 RX ORDER — EPHEDRINE SULFATE 5 MG/ML
INJECTION INTRAVENOUS
Status: COMPLETED
Start: 2025-02-01 | End: 2025-02-01

## 2025-02-01 RX ORDER — CARBOPROST TROMETHAMINE 250 UG/ML
250 INJECTION, SOLUTION INTRAMUSCULAR
Status: DISCONTINUED | OUTPATIENT
Start: 2025-02-01 | End: 2025-02-01 | Stop reason: HOSPADM

## 2025-02-01 RX ORDER — HYDROCODONE BITARTRATE AND ACETAMINOPHEN 10; 325 MG/1; MG/1
1 TABLET ORAL EVERY 4 HOURS PRN
Status: DISCONTINUED | OUTPATIENT
Start: 2025-02-01 | End: 2025-02-03 | Stop reason: HOSPADM

## 2025-02-01 RX ORDER — LEVOTHYROXINE SODIUM 75 UG/1
75 CAPSULE ORAL DAILY
Status: DISCONTINUED | OUTPATIENT
Start: 2025-02-01 | End: 2025-02-03 | Stop reason: HOSPADM

## 2025-02-01 RX ORDER — CITRIC ACID/SODIUM CITRATE 334-500MG
30 SOLUTION, ORAL ORAL ONCE
Status: DISCONTINUED | OUTPATIENT
Start: 2025-02-01 | End: 2025-02-01 | Stop reason: HOSPADM

## 2025-02-01 RX ORDER — FAMOTIDINE 10 MG/ML
20 INJECTION, SOLUTION INTRAVENOUS ONCE AS NEEDED
Status: DISCONTINUED | OUTPATIENT
Start: 2025-02-01 | End: 2025-02-01 | Stop reason: HOSPADM

## 2025-02-01 RX ORDER — ONDANSETRON 2 MG/ML
4 INJECTION INTRAMUSCULAR; INTRAVENOUS ONCE AS NEEDED
Status: DISCONTINUED | OUTPATIENT
Start: 2025-02-01 | End: 2025-02-01 | Stop reason: HOSPADM

## 2025-02-01 RX ORDER — DIPHENHYDRAMINE HYDROCHLORIDE 50 MG/ML
12.5 INJECTION INTRAMUSCULAR; INTRAVENOUS EVERY 8 HOURS PRN
Status: DISCONTINUED | OUTPATIENT
Start: 2025-02-01 | End: 2025-02-01 | Stop reason: HOSPADM

## 2025-02-01 RX ORDER — BISACODYL 10 MG
10 SUPPOSITORY, RECTAL RECTAL DAILY PRN
Status: DISCONTINUED | OUTPATIENT
Start: 2025-02-02 | End: 2025-02-03 | Stop reason: HOSPADM

## 2025-02-01 RX ORDER — LIDOCAINE HYDROCHLORIDE AND EPINEPHRINE 15; 5 MG/ML; UG/ML
INJECTION, SOLUTION EPIDURAL AS NEEDED
Status: DISCONTINUED | OUTPATIENT
Start: 2025-02-01 | End: 2025-02-01 | Stop reason: SURG

## 2025-02-01 RX ORDER — OXYTOCIN/0.9 % SODIUM CHLORIDE 30/500 ML
125 PLASTIC BAG, INJECTION (ML) INTRAVENOUS ONCE AS NEEDED
Status: DISCONTINUED | OUTPATIENT
Start: 2025-02-01 | End: 2025-02-03 | Stop reason: HOSPADM

## 2025-02-01 RX ORDER — FLUTICASONE PROPIONATE 50 MCG
2 SPRAY, SUSPENSION (ML) NASAL DAILY
Status: DISCONTINUED | OUTPATIENT
Start: 2025-02-01 | End: 2025-02-03 | Stop reason: HOSPADM

## 2025-02-01 RX ORDER — DOCUSATE SODIUM 100 MG/1
100 CAPSULE, LIQUID FILLED ORAL 2 TIMES DAILY
Status: DISCONTINUED | OUTPATIENT
Start: 2025-02-01 | End: 2025-02-03 | Stop reason: HOSPADM

## 2025-02-01 RX ORDER — HYDROCODONE BITARTRATE AND ACETAMINOPHEN 5; 325 MG/1; MG/1
1 TABLET ORAL EVERY 4 HOURS PRN
Status: DISCONTINUED | OUTPATIENT
Start: 2025-02-01 | End: 2025-02-03 | Stop reason: HOSPADM

## 2025-02-01 RX ORDER — MISOPROSTOL 200 UG/1
800 TABLET ORAL ONCE AS NEEDED
Status: DISCONTINUED | OUTPATIENT
Start: 2025-02-01 | End: 2025-02-01 | Stop reason: HOSPADM

## 2025-02-01 RX ORDER — ACETAMINOPHEN 325 MG/1
650 TABLET ORAL EVERY 6 HOURS PRN
Status: DISCONTINUED | OUTPATIENT
Start: 2025-02-01 | End: 2025-02-03 | Stop reason: HOSPADM

## 2025-02-01 RX ORDER — IBUPROFEN 600 MG/1
600 TABLET, FILM COATED ORAL EVERY 6 HOURS PRN
Status: DISCONTINUED | OUTPATIENT
Start: 2025-02-01 | End: 2025-02-03 | Stop reason: HOSPADM

## 2025-02-01 RX ORDER — SODIUM CHLORIDE 0.9 % (FLUSH) 0.9 %
1-10 SYRINGE (ML) INJECTION AS NEEDED
Status: DISCONTINUED | OUTPATIENT
Start: 2025-02-01 | End: 2025-02-03 | Stop reason: HOSPADM

## 2025-02-01 RX ORDER — METHYLERGONOVINE MALEATE 0.2 MG/ML
200 INJECTION INTRAVENOUS ONCE AS NEEDED
Status: DISCONTINUED | OUTPATIENT
Start: 2025-02-01 | End: 2025-02-01 | Stop reason: HOSPADM

## 2025-02-01 RX ORDER — METOCLOPRAMIDE HYDROCHLORIDE 5 MG/ML
10 INJECTION INTRAMUSCULAR; INTRAVENOUS ONCE AS NEEDED
Status: DISCONTINUED | OUTPATIENT
Start: 2025-02-01 | End: 2025-02-01 | Stop reason: HOSPADM

## 2025-02-01 RX ADMIN — WITCH HAZEL 1 PAD: 500 SOLUTION RECTAL; TOPICAL at 11:59

## 2025-02-01 RX ADMIN — EPHEDRINE SULFATE 10 MG: 5 INJECTION INTRAVENOUS at 05:55

## 2025-02-01 RX ADMIN — Medication 2 MILLI-UNITS/MIN: at 00:15

## 2025-02-01 RX ADMIN — IBUPROFEN 600 MG: 600 TABLET, FILM COATED ORAL at 12:06

## 2025-02-01 RX ADMIN — FENTANYL CITRATE 100 MCG: 50 INJECTION, SOLUTION INTRAMUSCULAR; INTRAVENOUS at 04:15

## 2025-02-01 RX ADMIN — DOCUSATE SODIUM 100 MG: 100 CAPSULE, LIQUID FILLED ORAL at 12:06

## 2025-02-01 RX ADMIN — IBUPROFEN 600 MG: 600 TABLET, FILM COATED ORAL at 18:10

## 2025-02-01 RX ADMIN — Medication: at 11:59

## 2025-02-01 RX ADMIN — ONDANSETRON 4 MG: 2 INJECTION INTRAMUSCULAR; INTRAVENOUS at 00:07

## 2025-02-01 RX ADMIN — LIDOCAINE HYDROCHLORIDE AND EPINEPHRINE 2 ML: 15; 5 INJECTION, SOLUTION EPIDURAL at 04:12

## 2025-02-01 RX ADMIN — SODIUM CHLORIDE, POTASSIUM CHLORIDE, SODIUM LACTATE AND CALCIUM CHLORIDE 1000 ML: 600; 310; 30; 20 INJECTION, SOLUTION INTRAVENOUS at 03:48

## 2025-02-01 RX ADMIN — ACETAMINOPHEN 650 MG: 325 TABLET ORAL at 21:49

## 2025-02-01 RX ADMIN — LIDOCAINE HYDROCHLORIDE AND EPINEPHRINE 3 ML: 15; 5 INJECTION, SOLUTION EPIDURAL at 04:09

## 2025-02-01 RX ADMIN — ROPIVACAINE HYDROCHLORIDE 7 ML: 5 INJECTION, SOLUTION EPIDURAL; INFILTRATION; PERINEURAL at 04:15

## 2025-02-01 RX ADMIN — BUTORPHANOL TARTRATE 2 MG: 2 INJECTION, SOLUTION INTRAMUSCULAR; INTRAVENOUS at 02:38

## 2025-02-01 RX ADMIN — ROPIVACAINE HYDROCHLORIDE 15 ML/HR: 2 INJECTION, SOLUTION EPIDURAL; INFILTRATION at 04:17

## 2025-02-01 RX ADMIN — BUTORPHANOL TARTRATE 2 MG: 2 INJECTION, SOLUTION INTRAMUSCULAR; INTRAVENOUS at 00:04

## 2025-02-01 RX ADMIN — DOCUSATE SODIUM 100 MG: 100 CAPSULE, LIQUID FILLED ORAL at 21:44

## 2025-02-01 RX ADMIN — Medication 999 ML/HR: at 08:08

## 2025-02-01 NOTE — ANESTHESIA PREPROCEDURE EVALUATION
Anesthesia Evaluation     Patient summary reviewed and Nursing notes reviewed                Airway   Mallampati: II  TM distance: >3 FB  Neck ROM: full  Dental - normal exam     Pulmonary - negative pulmonary ROS   Cardiovascular     (+) hypertension      Neuro/Psych- negative ROS  GI/Hepatic/Renal/Endo    (+) obesity, morbid obesity, renal disease-, thyroid problem hypothyroidism and hyperthyroidism    Musculoskeletal     Abdominal    Substance History - negative use     OB/GYN    (+) Pregnant        Other   arthritis,                 Anesthesia Plan    ASA 2     epidural       Anesthetic plan, risks, benefits, and alternatives have been provided, discussed and informed consent has been obtained with: patient.    CODE STATUS:    Level Of Support Discussed With: Patient  Code Status (Patient has no pulse and is not breathing): CPR (Attempt to Resuscitate)  Medical Interventions (Patient has pulse or is breathing): Full Support

## 2025-02-01 NOTE — L&D DELIVERY NOTE
" [facility]   Vaginal Delivery Note    Patient Name: Onel Zapata  : 2000  MRN: [id number]    Date of Delivery: 2025    Diagnosis     Pre & Post-Delivery:  Intrauterine pregnancy at 39w6d  Labor status: Spontaneous Onset of Labor    Third trimester pregnancy     (normal spontaneous vaginal delivery)             Problem List    Transfer to Postpartum     Review the Delivery Report for details.     Delivery     Delivery: Vaginal, Spontaneous    YOB: 2024   Time of Birth:  Gestational Age 8:04 AM  39w6d     Anesthesia: Epidural    Delivering clinician: Ann Marie Young   Forceps?   No   Vacuum? No    Shoulder dystocia present: No        Delivery narrative:  The patient was checked and found to be 10/100/+1. Patient pushed over an intact perineum for a vaginal delivery. There was a tight nuchal cord x 2 that was reduced after delivery of the fetal head. The infant's shoulders followed without difficulty. The placenta followed spontaneously. The uterus was explored. There was a second degree perineal laceration that was repaired with 2-0 Vicryl Rapide in a running locking fashion to repair the vaginal mucosa. A 2-0 Vicryl was used to repair the muscular defect with 4 interrupted sutures. The 2-0Vicryl Rapide was then used to close the perineal skin in a running fashion. Bleeding was stable at the end of the procedure. Mother and infant left in stable condition in labor and delivery room.       Infant     Findings: female infant     Infant observations: Weight: 3100 g (6 lb 13.4 oz)  Length: 20 in  Observations/Comments:        Apgars: 9  @ 1 minute /    9  @ 5 minutes   Infant Name: Maria Eugenia     Placenta & Cord         Placenta delivered  Spontaneous at   2025  8:08 AM    Cord: 3 vessels present.   Nuchal Cord?  yes; Number of nuchal loops present:  2   Cord blood obtained: Yes   Cord gases obtained:  No   Cord gas results: Venous:  No results found for: \"PHCVEN\", \"BECVEN\"    " "Arterial:  No results found for: \"PHCART\", \"BECART\"     Repair     Episiotomy: None    No    Lacerations: Yes  Laceration Information  Laceration Repaired?   Perineal: 2nd Yes   Periurethral:    No   Labial:       Sulcus:       Vaginal:       Cervical:         Suture used for repair: 2-0 Vicryl and 2-0 Vicryl Rapide      Estimated Blood Loss:  300 ml      Quantitative Blood Loss:    QBL from VAG DEL: 300 (02/01/25 0822)     Complications     none    Disposition     Mother to Mother Baby/Postpartum  in stable condition currently.  Baby to remains with mom  in stable condition currently.    Ann Marie Young MD  02/01/25  08:37 EST        "

## 2025-02-01 NOTE — PROGRESS NOTES
Labor progress note    Onel Zapata is a 24 y.o.  at 39w6d who presented in labor. She has pitocin for augmentation of labor. Patient feeling well.     /59   Pulse 88   Temp 97.7 °F (36.5 °C) (Oral)   Resp 18   LMP 2024      FHR tracing   Baseline 135 bpm, moderate variability, + accelerations, no decelerations  Cat 1     AROM performed for small amount of blood tinged fluid  Cervix /-1   IUPC placed.     Continue augmentation of labor.     Ann Marie Young MD  Obstetrics and Gynecology  [facility]

## 2025-02-01 NOTE — PLAN OF CARE
Problem: Adult Inpatient Plan of Care  Goal: Plan of Care Review  Outcome: Progressing  Flowsheets (Taken 2/1/2025 1840)  Progress: improving  Outcome Evaluation: VSS. Fundus, lochia WDL. Voiding w/o difficulty. Pain controlled w/ PO meds.  Plan of Care Reviewed With: patient  Goal: Patient-Specific Goal (Individualized)  Outcome: Progressing  Goal: Absence of Hospital-Acquired Illness or Injury  Outcome: Progressing  Intervention: Identify and Manage Fall Risk  Recent Flowsheet Documentation  Taken 2/1/2025 1810 by Carolina Dong RN  Safety Promotion/Fall Prevention: safety round/check completed  Taken 2/1/2025 1550 by Carolina Dong RN  Safety Promotion/Fall Prevention: safety round/check completed  Taken 2/1/2025 1206 by Carolina Dong RN  Safety Promotion/Fall Prevention: safety round/check completed  Taken 2/1/2025 1150 by Carolina Dong RN  Safety Promotion/Fall Prevention: safety round/check completed  Goal: Optimal Comfort and Wellbeing  Outcome: Progressing  Intervention: Monitor Pain and Promote Comfort  Recent Flowsheet Documentation  Taken 2/1/2025 1810 by Carolina Dong RN  Pain Management Interventions: pain medication given  Taken 2/1/2025 1550 by Carolina Dong RN  Pain Management Interventions:   medication offered but refused   no interventions per patient request  Taken 2/1/2025 1206 by Carolina Dong RN  Pain Management Interventions:   pain medication given   cold applied   position adjusted  Taken 2/1/2025 1050 by Carolina Dong RN  Pain Management Interventions: pain management plan reviewed with patient/caregiver  Intervention: Provide Person-Centered Care  Recent Flowsheet Documentation  Taken 2/1/2025 1050 by Carolina Dong RN  Trust Relationship/Rapport:   care explained   choices provided   questions answered   questions encouraged  Goal: Readiness for Transition of Care  Outcome: Progressing     Problem: Postpartum (Vaginal Delivery)  Goal: Successful Parent  Role Transition  Outcome: Progressing  Goal: Hemostasis  Outcome: Progressing  Goal: Absence of Infection Signs and Symptoms  Outcome: Progressing  Goal: Anesthesia/Sedation Recovery  Outcome: Progressing  Intervention: Optimize Anesthesia Recovery  Recent Flowsheet Documentation  Taken 2/1/2025 1810 by Carolina Dong RN  Safety Promotion/Fall Prevention: safety round/check completed  Taken 2/1/2025 1550 by Carolina Dong RN  Safety Promotion/Fall Prevention: safety round/check completed  Taken 2/1/2025 1206 by Carolina Dong RN  Safety Promotion/Fall Prevention: safety round/check completed  Taken 2/1/2025 1150 by Carolina Dong RN  Safety Promotion/Fall Prevention: safety round/check completed  Goal: Optimal Pain Control and Function  Outcome: Progressing  Intervention: Prevent or Manage Pain  Recent Flowsheet Documentation  Taken 2/1/2025 1810 by Carolina Dong RN  Pain Management Interventions: pain medication given  Taken 2/1/2025 1550 by Carolina Dong RN  Pain Management Interventions:   medication offered but refused   no interventions per patient request  Taken 2/1/2025 1206 by Carolina Dong RN  Pain Management Interventions:   pain medication given   cold applied   position adjusted  Taken 2/1/2025 1150 by Carolina Dong RN  Perineal Care:   absorbent brief/pad changed   medicated pads applied   perineum cleansed   topical anesthetic preparation applied  Taken 2/1/2025 1050 by Carolina Dong RN  Perineal Care:   absorbent brief/pad changed   cold pack/ice pack applied   perineal hygiene encouraged   perineal spray bottle/warm water use encouraged   sitz bath taken  Pain Management Interventions: pain management plan reviewed with patient/caregiver  Goal: Effective Urinary Elimination  Outcome: Progressing   Goal Outcome Evaluation:  Plan of Care Reviewed With: patient        Progress: improving  Outcome Evaluation: VSS. Fundus, lochia WDL. Voiding w/o difficulty. Pain controlled  w/ PO meds.

## 2025-02-01 NOTE — ANESTHESIA PROCEDURE NOTES
Labor Epidural      Patient reassessed immediately prior to procedure    Patient location during procedure: OB  Performed By  Anesthesiologist: Ana Lewis DO  Preanesthetic Checklist  Completed: patient identified, IV checked, risks and benefits discussed, surgical consent, monitors and equipment checked, pre-op evaluation and timeout performed  Additional Notes  CSE performed using 25g Glen  Prep:  Pt Position:sitting  Sterile Tech:cap, gloves, mask and sterile barrier  Prep:chlorhexidine gluconate and isopropyl alcohol  Monitoring:blood pressure monitoring  Epidural Block Procedure:  Approach:midline  Guidance:palpation technique  Location:L3-L4  Needle Type:Tuohy  Needle Gauge:17 G  Loss of Resistance Medium: air  Loss of Resistance: 6cm  Cath Depth at skin:13 cm  Paresthesia: none  Aspiration:negative  Test Dose:negative  Number of Attempts: 1  Post Assessment:  Dressing:occlusive dressing applied and secured with tape  Pt Tolerance:patient tolerated the procedure well with no apparent complications  Complications:no             Apixaban/Eliquis is used to treat and prevent blood clots. If you are not able to swallow the tablets whole, they may be crushed and mixed in water, apple juice, or applesauce and promptly taken within four hours. Never skip a dose of Apixaban/Eliquis. If you forget to take your Apixaban/Eliquis, take a dose as soon as you remember. If it is almost time for your next Apixaban/Eliquis dose, wait until then and take a regular dose. DO NOT take an extra pill to ‘catch up’.  NEVER TAKE A DOUBLE DOSE. Notify your doctor that you missed a dose. Take Apixaban/Eliquis at the same time each morning and evening. Apixaban/Eliquis may be taken with other medication or food.

## 2025-02-02 LAB
BACTERIA UR CULT: NORMAL
BACTERIA UR CULT: NORMAL
BASOPHILS # BLD AUTO: 0.03 10*3/MM3 (ref 0–0.2)
BASOPHILS NFR BLD AUTO: 0.4 % (ref 0–1.5)
DEPRECATED RDW RBC AUTO: 43.1 FL (ref 37–54)
EOSINOPHIL # BLD AUTO: 0.12 10*3/MM3 (ref 0–0.4)
EOSINOPHIL NFR BLD AUTO: 1.4 % (ref 0.3–6.2)
ERYTHROCYTE [DISTWIDTH] IN BLOOD BY AUTOMATED COUNT: 13.2 % (ref 12.3–15.4)
HCT VFR BLD AUTO: 31.7 % (ref 34–46.6)
HGB BLD-MCNC: 10.5 G/DL (ref 12–15.9)
IMM GRANULOCYTES # BLD AUTO: 0.03 10*3/MM3 (ref 0–0.05)
IMM GRANULOCYTES NFR BLD AUTO: 0.4 % (ref 0–0.5)
LYMPHOCYTES # BLD AUTO: 2.26 10*3/MM3 (ref 0.7–3.1)
LYMPHOCYTES NFR BLD AUTO: 26.7 % (ref 19.6–45.3)
MCH RBC QN AUTO: 29.7 PG (ref 26.6–33)
MCHC RBC AUTO-ENTMCNC: 33.1 G/DL (ref 31.5–35.7)
MCV RBC AUTO: 89.8 FL (ref 79–97)
MONOCYTES # BLD AUTO: 0.69 10*3/MM3 (ref 0.1–0.9)
MONOCYTES NFR BLD AUTO: 8.1 % (ref 5–12)
NEUTROPHILS NFR BLD AUTO: 5.34 10*3/MM3 (ref 1.7–7)
NEUTROPHILS NFR BLD AUTO: 63 % (ref 42.7–76)
NRBC BLD AUTO-RTO: 0 /100 WBC (ref 0–0.2)
PLATELET # BLD AUTO: 195 10*3/MM3 (ref 140–450)
PMV BLD AUTO: 10 FL (ref 6–12)
RBC # BLD AUTO: 3.53 10*6/MM3 (ref 3.77–5.28)
WBC NRBC COR # BLD AUTO: 8.47 10*3/MM3 (ref 3.4–10.8)

## 2025-02-02 PROCEDURE — 85025 COMPLETE CBC W/AUTO DIFF WBC: CPT | Performed by: STUDENT IN AN ORGANIZED HEALTH CARE EDUCATION/TRAINING PROGRAM

## 2025-02-02 RX ADMIN — DOCUSATE SODIUM 100 MG: 100 CAPSULE, LIQUID FILLED ORAL at 08:26

## 2025-02-02 RX ADMIN — HYDROCODONE BITARTRATE AND ACETAMINOPHEN 1 TABLET: 5; 325 TABLET ORAL at 18:44

## 2025-02-02 RX ADMIN — IBUPROFEN 600 MG: 600 TABLET, FILM COATED ORAL at 15:53

## 2025-02-02 RX ADMIN — ACETAMINOPHEN 650 MG: 325 TABLET ORAL at 14:18

## 2025-02-02 RX ADMIN — HYDROCODONE BITARTRATE AND ACETAMINOPHEN 1 TABLET: 5; 325 TABLET ORAL at 05:51

## 2025-02-02 RX ADMIN — DOCUSATE SODIUM 100 MG: 100 CAPSULE, LIQUID FILLED ORAL at 21:05

## 2025-02-02 RX ADMIN — IBUPROFEN 600 MG: 600 TABLET, FILM COATED ORAL at 08:26

## 2025-02-02 RX ADMIN — Medication: at 18:48

## 2025-02-02 NOTE — PROGRESS NOTES
Postpartum Progress Note    Patient name: Onel Zapata  YOB: 2000   MRN: [id number]  Referring Provider: Edilia Munoz MD  Admission Date: 2025  Date of Service: 2025    ID: 24 y.o.     Diagnosis:   S/p vaginal delivery     Third trimester pregnancy    Hypothyroidism due to Hashimoto's thyroiditis    History of chronic hypertension    GBS bacteriuria     (normal spontaneous vaginal delivery)       Subjective:      No complaints.  Moderate lochia.  Ambulating, voiding, tolerating diet.  Pain well controlled.  The patient is not currently breastfeeding.   This baby is a female    Objective:      Vital signs:  Vital Signs Range for the last 24 hours  Temperature: Temp:  [98 °F (36.7 °C)-98.5 °F (36.9 °C)] 98.3 °F (36.8 °C)   Temp Source: Temp src: Oral   BP: BP: (114-132)/(53-86) 130/84   Pulse: Heart Rate:  [] 66   Respirations: Resp:  [16] 16   Weight: 114 kg (251 lb)     General: Alert & oriented x4, in no apparent distress  Abdomen: soft, nontender  Uterus: firm, nontender  Extremities: nontender; no edema      Labs:  Lab Results   Component Value Date    WBC 8.47 2025    HGB 10.5 (L) 2025    HCT 31.7 (L) 2025    MCV 89.8 2025     2025     Results from last 7 days   Lab Units 25  1758   ABO TYPING  A   RH TYPING  Positive     External Prenatal Results       Pregnancy Outside Results - Transcribed From Office Records - See Scanned Records For Details       Test Value Date Time    ABO  A  25 1758    Rh  Positive  25 1758    Antibody Screen  Negative  25 1641       Negative  24 0938       Negative  07/10/24 1445    Varicella IgG       Rubella  2.37 index 07/10/24 1445    Hgb  10.5 g/dL 25 1012       13.4 g/dL 25 1641       11.5 g/dL 24 0938       13.3 g/dL 24 1838       11.6 g/dL 24 0843       12.8 g/dL 24 1422       12.4 g/dL 07/10/24 1445    Hct  31.7 % 25  1012       40.0 % 01/31/25 1641       34.3 % 11/12/24 0938       37.3 % 08/31/24 1838       33.5 % 08/27/24 0843       35.9 % 08/26/24 1422       37.1 % 07/10/24 1445    HgB A1c        1h GTT  87 mg/dL 11/12/24 0938    3h GTT Fasting       3h GTT 1 hour       3h GTT 2 hour       3h GTT 3 hour        Gonorrhea (discrete)  Negative  07/10/24 1445    Chlamydia (discrete)  Negative  07/10/24 1445    RPR  Non Reactive  11/12/24 0938       Non Reactive  07/10/24 1445    Syphils cascade: TP-Ab (FTA)  Non-Reactive  01/31/25 1641    TP-Ab  Non-Reactive  01/31/25 1641    TP-Ab (EIA)       TPPA       HBsAg  Negative  07/10/24 1445    Herpes Simplex Virus PCR       Herpes Simplex VIrus Culture       HIV  Non Reactive  07/10/24 1445    Hep C RNA Quant PCR       Hep C Antibody  Non Reactive  07/10/24 1445    AFP       NIPT       Cystic Fibrosis (Michael)       Cystic Fibroisis        Spinal Muscular atrophy       Fragile X       Group B Strep  No Group B Streptococcus Isolated at 2 days  01/14/25 1838    GBS Susceptibility to Clindamycin       GBS Susceptibility to Erythromycin       Fetal Fibronectin       Genetic Testing, Maternal Blood                 Drug Screening       Test Value Date Time    Urine Drug Screen       Amphetamine Screen  Negative  08/26/24 2351       Negative ng/mL 07/10/24 1445    Barbiturate Screen  Negative  08/26/24 2351       Negative ng/mL 07/10/24 1445    Benzodiazepine Screen  Negative  08/26/24 2351       Negative ng/mL 07/10/24 1445    Methadone Screen  Negative  08/26/24 2351       Negative ng/mL 07/10/24 1445    Phencyclidine Screen  Negative  08/26/24 2351       Negative ng/mL 07/10/24 1445    Opiates Screen  Negative  08/26/24 2351    THC Screen  Positive  08/26/24 2351    Cocaine Screen       Propoxyphene Screen  Negative ng/mL 07/10/24 1445    Buprenorphine Screen  Negative  08/26/24 2351    Methamphetamine Screen       Oxycodone Screen  Negative  08/26/24 2351    Tricyclic Antidepressants  Screen  Negative  08/26/24 6784              Legend    ^: Historical                            Assessment/Plan:      PPD#1 s/p vaginal delivery.  1. S/p vaginal delivery: Doing well.Continue routine postpartum care.    2. Infant feeding: Supportive care.  The patient is not currently breastfeeding.  3. Asymptomatic postpartum anemia: Vital signs stable. Denies symptoms. Will send home with oral iron supplement.    4. History of chronic hypertension: Blood pressures stable without meds-no s/s of preeclampsia

## 2025-02-02 NOTE — ANESTHESIA POSTPROCEDURE EVALUATION
Patient: Onel Zapata    Procedure Summary       Date: 02/01/25 Room / Location:     Anesthesia Start: 0358 Anesthesia Stop: 0808    Procedure: LABOR ANALGESIA Diagnosis:     Scheduled Providers:  Provider: Ana Lewis DO    Anesthesia Type: epidural ASA Status: 2            Anesthesia Type: epidural    Vitals  Vitals Value Taken Time   /84 02/02/25 0745   Temp 98.3 °F (36.8 °C) 02/02/25 0745   Pulse 66 02/02/25 0745   Resp 16 02/02/25 0745   SpO2             Post Anesthesia Care and Evaluation    Patient location during evaluation: bedside  Patient participation: complete - patient participated  Level of consciousness: awake and awake and alert  Pain score: 0  Pain management: satisfactory to patient    Airway patency: patent  Anesthetic complications: No anesthetic complications  PONV Status: none  Cardiovascular status: acceptable, hemodynamically stable and stable  Respiratory status: acceptable  Hydration status: stable  Post Neuraxial Block status: Motor and sensory function returned to baseline and No signs or symptoms of PDPH

## 2025-02-03 ENCOUNTER — PATIENT OUTREACH (OUTPATIENT)
Dept: LABOR AND DELIVERY | Facility: HOSPITAL | Age: 25
End: 2025-02-03
Payer: MEDICAID

## 2025-02-03 VITALS
TEMPERATURE: 97.7 F | BODY MASS INDEX: 39.39 KG/M2 | HEIGHT: 67 IN | RESPIRATION RATE: 16 BRPM | DIASTOLIC BLOOD PRESSURE: 84 MMHG | WEIGHT: 251 LBS | SYSTOLIC BLOOD PRESSURE: 130 MMHG | HEART RATE: 82 BPM

## 2025-02-03 PROBLEM — Z34.93 THIRD TRIMESTER PREGNANCY: Status: RESOLVED | Noted: 2025-01-31 | Resolved: 2025-02-03

## 2025-02-03 PROBLEM — R82.71 GBS BACTERIURIA: Status: RESOLVED | Noted: 2024-08-07 | Resolved: 2025-02-03

## 2025-02-03 RX ORDER — IBUPROFEN 600 MG/1
600 TABLET, FILM COATED ORAL EVERY 6 HOURS PRN
Qty: 90 TABLET | Refills: 0 | Status: SHIPPED | OUTPATIENT
Start: 2025-02-03

## 2025-02-03 RX ORDER — PSEUDOEPHEDRINE HCL 30 MG
100 TABLET ORAL 2 TIMES DAILY
Qty: 60 CAPSULE | Refills: 0 | Status: SHIPPED | OUTPATIENT
Start: 2025-02-03

## 2025-02-03 RX ADMIN — IBUPROFEN 600 MG: 600 TABLET, FILM COATED ORAL at 08:54

## 2025-02-03 RX ADMIN — HYDROCODONE BITARTRATE AND ACETAMINOPHEN 1 TABLET: 10; 325 TABLET ORAL at 09:04

## 2025-02-03 RX ADMIN — DOCUSATE SODIUM 100 MG: 100 CAPSULE, LIQUID FILLED ORAL at 08:54

## 2025-02-03 RX ADMIN — FAMOTIDINE 20 MG: 20 TABLET, FILM COATED ORAL at 08:54

## 2025-02-03 NOTE — DISCHARGE SUMMARY
Discharge Summary    Date of Admission: 2025  Date of Discharge:  2/3/2025      Patient: Onel Zapata      MR#:[id number]    Delivery Provider: Ann Marie Young    Discharge Surgeon/OB: Edilia Munoz    Presenting Problem/History of Present Illness  Third trimester pregnancy [Z34.93]  Normal labor [O80, Z37.9]       Hypothyroidism due to Hashimoto's thyroiditis    History of chronic hypertension     (normal spontaneous vaginal delivery)    Postpartum care and examination immediately after delivery         Discharge Diagnosis: Vaginal delivery at 39w6d    Procedures:  Vaginal, Spontaneous    2025   8:04 AM       Discharge Date: 2/3/2025;     Hospital Course  Patient is a 24 y.o. female  at 39w6d status post vaginal delivery without complication. Postpartum the patient did well. She remained afebrile, with vital signs stable. She was ready for discharge on postpartum day 2.     Infant:   female fetus 3100 g (6 lb 13.4 oz) with Apgar scores of 9 , 9  at five minutes.    Condition on Discharge:  Stable    Vital Signs  Temp:  [97.7 °F (36.5 °C)-98.2 °F (36.8 °C)] 97.7 °F (36.5 °C)  Heart Rate:  [53-82] 82  Resp:  [16] 16  BP: (128-139)/(74-84) 130/84    Lab Results   Component Value Date    WBC 8.47 2025    HGB 10.5 (L) 2025    HCT 31.7 (L) 2025    MCV 89.8 2025     2025       Discharge Disposition  Home or Self Care    Discharge Medications     Discharge Medications        New Medications        Instructions Start Date   docusate sodium 100 MG capsule   100 mg, Oral, 2 Times Daily      ibuprofen 600 MG tablet  Commonly known as: ADVIL,MOTRIN   600 mg, Oral, Every 6 Hours PRN      witch hazel-glycerin pad  Commonly known as: TUCKS   1 Pad, Topical, As Needed             Continue These Medications        Instructions Start Date   prenatal (CLASSIC) vitamin 28-0.8 MG tablet tablet  Generic drug: prenatal vitamin   Daily      Tirosint 75 MCG capsule  Generic drug:  levothyroxine sodium   75 mcg, Oral, Daily, Cannot tolerate levothyroxine             Stop These Medications      famotidine 20 MG tablet  Commonly known as: PEPCID     fluticasone 50 MCG/ACT nasal spray  Commonly known as: FLONASE              Discharge Diet: Regular as tolerated    Activity at Discharge:   Activity Instructions       Activity as Tolerated      Pelvic Rest      Pelvic rest including no tampons, tub baths, or intercourse until 6 weeks postpartum/cleared by Provider.            Follow-up Appointments  Future Appointments   Date Time Provider Department Center       [facility]   2/21/2025  1:30 PM Leo Foster PA-C [facility]     Additional Instructions for the Follow-ups that You Need to Schedule       Call MD for problems / concerns.   As directed      Call with foul smelling discharge, fever of >100.4, signs of postpartum depression, saturating a pad in <1 hour, blood clots larger than a golf ball. Also call with headache that does not resolve with medication or rest, vision changes, pain in right upper quadrant, worsening swelling, or BP >140/90 if able to monitor at home.    Order Comments: Call with foul smelling discharge, fever of >100.4, signs of postpartum depression, saturating a pad in <1 hour, blood clots larger than a golf ball. Also call with headache that does not resolve with medication or rest, vision changes, pain in right upper quadrant, worsening swelling, or BP >140/90 if able to monitor at home.         Discharge Follow-up with Specialty: NP/Tammy; 1 Week   As directed      Specialty: SHIREEN/Tammy   Follow Up: 1 Week   Follow Up Details: BP check                RG Ramos  02/03/25  09:59 EST  Csd

## 2025-02-03 NOTE — OUTREACH NOTE
[facility]  IP Postpartum    Questions/Answers      Flowsheet Row Responses   Best Method for Contacting Cell   Support Person Present Yes   Does the patient have a car seat at the hospital Yes   Delivery Note Reviewed Reviewed   Were birth expectations met? Yes   Is there a need for additional support/resources? No   Lactation Note Reviewed --  [Formula feeding]   Is additional support needed? No   Any questions or concerns? No   Is the patient going to use Meds to Beds? Yes   Any concerns related discharge meds/ability to  prescriptions? No   Confirm Postpartum OB appointment No  [Planned F/U with Tammy]   Confirm initial well-child Pediatrician appointment date/time: Yes  [Plnned f/u with Nanci Reyes]   Initial Well Child Pediatrician Appointment Date 25   Additional post-discharge F/U appointments No   Does patient have transportation to appointments? Yes   Any other assistance needed to ensure she is able to attend appointments? No   Does patient have supplies needed at home for  care? Clothing, Crib, Diapers, Formula          Feeling well after delivery. Plans discharge today. Signs and symptoms of preeclampsia reviewed. VU. Provided with POST Birth warning signs flyer from [employer]. Encouraged to call with questions, concerns, or for support. Contact information provided. Will f/u 25.    Beba Gusman RN  Maternity Nurse Navigator    2/3/2025, 10:39 EST

## 2025-02-03 NOTE — LACTATION NOTE
"   02/03/25 0943   Maternal Information   Date of Referral 02/03/25   Person Making Referral lactation consultant  (courtesy prior to discharge)   Maternal Reason for Referral other (see comments)  (APRN recommended sports bra to wear; provided handout \"How To Dry Up Milk Supply\" if needed; encouraged a comfortable sports bra to wear and encouraged to call lactation if needed; mother verbalized understanding; no questions/concerns at this time.)       "

## 2025-02-10 ENCOUNTER — POSTPARTUM VISIT (OUTPATIENT)
Dept: OBSTETRICS AND GYNECOLOGY | Facility: CLINIC | Age: 25
End: 2025-02-10
Payer: MEDICAID

## 2025-02-10 VITALS
BODY MASS INDEX: 37.92 KG/M2 | SYSTOLIC BLOOD PRESSURE: 126 MMHG | WEIGHT: 241.6 LBS | HEIGHT: 67 IN | DIASTOLIC BLOOD PRESSURE: 80 MMHG

## 2025-02-10 DIAGNOSIS — Z86.79 HISTORY OF CHRONIC HYPERTENSION: ICD-10-CM

## 2025-02-10 DIAGNOSIS — Z01.30 BLOOD PRESSURE CHECK: ICD-10-CM

## 2025-02-10 NOTE — PROGRESS NOTES
Chief Complaint   Patient presents with    Postpartum Care     BP check       Postpartum blood pressure check visit         Onel Zapata is a 24 y.o.  who presents today for a blood pressure check.    Vaginal, Spontaneous   Information for the patient's :  Maria Eugenia Chavira [[id number]]   2025   female   Maria Eugenia Chavira   3100 g (6 lb 13.4 oz)   Gestational Age: 39w6d   Baby Discharged: Discharged with Mom  Delivering Physician: Ann Marie Young    The patient was diagnosed with Chronic hypertension.  The patient did not go home on blood pressure medication. She has not been checking BP since leaving hospital. The patient complains of None.  The patient denies Headache, Right upper quadrant/ epigastric pain, Vision changes, and Swelling.     Patient describes her vaginal bleeding as light.  Patient is bottle feeding.  She denies bowel or bladder issues.    Patient denies postpartum depression. Postpartum Depression Screening Questionnaire: 3, No treatment indicated.      The additional following portions of the patient's history were reviewed and updated as appropriate: allergies, current medications, past family history, past medical history, past social history, past surgical history, and problem list.      Review of Systems   Respiratory: Negative.  Negative for shortness of breath.    Cardiovascular: Negative.  Negative for chest pain.   Gastrointestinal: Negative.  Negative for abdominal distention, abdominal pain and constipation.   Genitourinary:  Positive for vaginal bleeding. Negative for breast discharge, breast lump, breast pain, dysuria, pelvic pain, pelvic pressure, vaginal discharge and vaginal pain.   Psychiatric/Behavioral: Negative.  Negative for depressed mood. The patient is not nervous/anxious.      All other systems reviewed and are negative.     I have reviewed and agree with the HPI, ROS, and historical information as entered above. Mercedez Murdock, RG      Objective  "  /80   Ht 170.2 cm (67\")   Wt 110 kg (241 lb 9.6 oz)   LMP 05/04/2024   Breastfeeding No   BMI 37.84 kg/m²     Physical Exam  Vitals and nursing note reviewed.   Constitutional:       General: She is not in acute distress.     Appearance: Normal appearance. She is not ill-appearing or toxic-appearing.   HENT:      Head: Normocephalic and atraumatic.   Pulmonary:      Effort: Pulmonary effort is normal.   Neurological:      Mental Status: She is alert and oriented to person, place, and time.   Psychiatric:         Attention and Perception: Attention normal.         Mood and Affect: Mood normal. Mood is not anxious or depressed.         Speech: Speech normal.         Behavior: Behavior normal. Behavior is cooperative.         Thought Content: Thought content normal. Thought content does not include homicidal or suicidal ideation. Thought content does not include homicidal or suicidal plan.         Judgment: Judgment normal.              Assessment and Plan    Problem List Items Addressed This Visit       History of chronic hypertension    Overview     Resolved after weight loss 2-3 yrs ago. Was on 2 different meds. But normal since then   Baby asa          Other Visit Diagnoses       Postpartum care following vaginal delivery    -  Primary    Blood pressure check                S/p Vaginal delivery 2/1/2025  Continued pelvic rest.   Continue monitoring BP at home. Call if >140/90 and PP pre-eclampsia signs reviewed  Return in about 5 weeks (around 3/17/2025) for 6 week PP visit.    Mercedez Murdock, RG  02/10/2025  "

## 2025-02-13 ENCOUNTER — PATIENT OUTREACH (OUTPATIENT)
Dept: LABOR AND DELIVERY | Facility: HOSPITAL | Age: 25
End: 2025-02-13
Payer: MEDICAID

## 2025-02-13 NOTE — OUTREACH NOTE
[facility]  Unable to Reach    Questions/Answers      Flowsheet Row Responses   Pending Outreach Postpartum Check-in   Call Attempt First   Outcome No answer/busy, Left message   Next Call Attempt Date 02/14/25   Unable to reach comments: LVM asking for return call. Will attempt contact tomorrow.            Beba Gusman RN  Maternity Nurse Navigator    2/13/2025, 10:28 EST

## 2025-02-14 ENCOUNTER — PATIENT OUTREACH (OUTPATIENT)
Dept: LABOR AND DELIVERY | Facility: HOSPITAL | Age: 25
End: 2025-02-14
Payer: MEDICAID

## 2025-02-14 NOTE — OUTREACH NOTE
"[facility]  Postpartum Check-In    Questions/Answers      Flowsheet Row Responses   Visit Setting Telephone   Best Method for Contacting Cell   OB Discharge Note Reviewed  Reviewed   OB Discharge Medications Reviewed  Reviewed    discharged home with mother? Yes   Current Pain Levels 0-10 0   At Rest Pain Levels 0-10 0   Pain level with activity 0-10 0   Acceptable Pain Level 0-10 3   Verbalized Emotional State Acceptance   Family/Support Network Family   Level of Involvement in Care Attentive   Do you feel comfortable in your relationship with your baby? Yes   Have members of your household adjusted to your baby? Yes   Is the baby's father supportive and/or involved with the baby? Yes   How does your partner feel about the baby? Happy, Involved   Do you feel safe at home, school and work? Yes   Are you in a relationship with someone who threatens you or hurts you? No   Do you have the resources to keep yourself and your baby healthy and safe? Yes   Lochia (per patient report) Brown-charly Red   Amount Spotting   Number of pads per day 2   Lochia Odor None   Is patient breastfeeding? No   How is breast suppression going? \"no problems\"   Postpartum Depression Screening Education Education Provided   Doctor Appointments: Education Provided   Postpartum Care Education Education Provided   S & S to report Education Provided   Followup Appointments Made Yes   Well Child Visit Appointments Made Yes   Appointment Date 25   Provider/Agency [facility]   Well Child Checkup Provider Name Nanci Reyes   Well Child Check Up Date: 25   Did you complete the visit? Yes   Were there any specific concerns? No   Umbilical Cord No reported signs or symptoms   Infant Feeding Method Formula   Formula Type Other   Other Formula Similac Sensitive   Formula PO (mL) 120 ml   Formula/Expressed Milk frequency of feedings: q3 hr   Number of wet diapers x 24 hours 10   Last BM x 24 hours 1   Emesis (Unmeasured Occurence) " only occ   What safe sleep surface is available? Bassinet   Are there stuffed animals, toys, pillows, quilts, blankets, wedges, positioners, bumpers or other loose bedding in the infant's sleeping environment? No   Where does the baby usually sleep? Bassinet   Does the baby ever share a sleep surface with a sibling, adult or pet? No   Does the baby ever share a sleep surface in a bed, couch, recliner or other? No   What position do you place your baby to sleep for naps? Back   What position do you place your baby to sleep at night Back   Are you and/or other caregivers smoking inside or outside the baby's home? No   Is the infant dressed appropiately for the temperature of the home? Yes   Do you use a clean, dry pacifier that is not attached to a string or stuffed animal? Yes            Review of Systems   Genitourinary:  Positive for vaginal bleeding.        Scant lochia   All other systems reviewed and are negative.    Most Recent [place]  Depression Scale Score (EPDS)    Performed by a clinician: 3 (2025 11:15 AM)    5 Ps Screen  completed    Feeling well since going home. Minimal pain and lochia WNL. States mood has been stable. Reports good family support. Suppression measures reviewed.    Baby doing well with no concerns at follow up appointment.     No community resource needs at present. Updated resources provided via [employer] message. RN from call center to f/u next week. Contact information provided. Encouraged to call with questions, concerns, or for support before then.    Beba Gusman RN  Maternity Nurse Navigator    2025, 11:27 EST

## 2025-02-21 ENCOUNTER — PATIENT OUTREACH (OUTPATIENT)
Dept: CALL CENTER | Facility: HOSPITAL | Age: 25
End: 2025-02-21
Payer: MEDICAID

## 2025-02-21 NOTE — OUTREACH NOTE
Motherhood Connection Survey      Flowsheet Row Responses   [facility]ond[facility] facility patient discharged from? [place]   Week 1 attempt successful? No   Unsuccessful attempts Attempt 2   Reschedule Tomorrow              ED MOYA - Registered Nurse

## 2025-02-21 NOTE — OUTREACH NOTE
Motherhood Connection Survey      Flowsheet Row Responses   [facility] facility patient discharged from? [facility]  [female vag]   Week 1 attempt successful? No   Unsuccessful attempts Attempt 1   Reschedule Today              ED MOYA - Registered Nurse

## 2025-02-22 ENCOUNTER — PATIENT OUTREACH (OUTPATIENT)
Dept: CALL CENTER | Facility: HOSPITAL | Age: 25
End: 2025-02-22
Payer: MEDICAID

## 2025-02-22 NOTE — OUTREACH NOTE
Motherhood Connection Survey      Flowsheet Row Responses   [facility] patient discharged from? [place]   Week 1 attempt successful? Yes   Call start time 1503   Call end time 1511   Baby sex Girl   [place] discharged home with mother? Yes   Baby sex Girl   Delivery type Vaginal   Emotional state Acceptance   Family support Yes   Do you have all necessary resources to care for you and your baby?  Yes   Have members of your household adjusted to your baby? Yes   Did you have any problems with pre-eclampsia during this pregnancy? No   Do you have any of the following: No Issues   Did you have blood glucose issues during this pregnancy No   Lochia amount None   Did you have an episiotomy/tear/abdominal incision? Yes   Additional comments 2nd degree tear---healing well.   Feeding Method Bottle   Frequency 3-4 hours   Amount 4 ounces   Breast Condition No   Nipple Condition No   Nursing Interventions Supportive bra   Signs baby is ready to eat Rooting, Crying, Finger sucking   Feeding tolerance Wet/dirty diapers, Weight gain, Adequate pause for breath   Number of wet diapers x 24 hours 10-13   Last BM x 24 hours 2-3   Umbilical Cord No reported signs or symptoms   Where does the baby usually sleep? Bassinet   Are there stuffed animals, toys, pillows, quilts, blankets, wedges, positioners, bumpers or other loose bedding in the infant's sleeping environment? No   Does the baby ever share a sleep surface in a bed, couch, recliner or other? No   What position do you lay your baby down to sleep? Back   Are you and/or other caregivers smoking inside or outside the baby's home? No   Mom appointment comments: saw OB/GYN week after discharge for b/p check. another appt in 5 weeks.   Baby appointment comments: seen Pediatrician, saw on 25. 2025 next appt.   Additional comments wearing sports bra   Feeding method comment Similiac Sensitive Formula   Call completed? Yes              Antonia Gray  Nurse

## 2025-03-13 ENCOUNTER — POSTPARTUM VISIT (OUTPATIENT)
Dept: OBSTETRICS AND GYNECOLOGY | Facility: CLINIC | Age: 25
End: 2025-03-13
Payer: MEDICAID

## 2025-03-13 VITALS
BODY MASS INDEX: 38.8 KG/M2 | HEIGHT: 67 IN | DIASTOLIC BLOOD PRESSURE: 80 MMHG | WEIGHT: 247.2 LBS | SYSTOLIC BLOOD PRESSURE: 118 MMHG

## 2025-03-13 DIAGNOSIS — Z01.419 PAP TEST, AS PART OF ROUTINE GYNECOLOGICAL EXAMINATION: ICD-10-CM

## 2025-03-13 DIAGNOSIS — Z30.011 ENCOUNTER FOR INITIAL PRESCRIPTION OF CONTRACEPTIVE PILLS: ICD-10-CM

## 2025-03-13 PROBLEM — F12.90 MARIJUANA USE DURING PREGNANCY: Status: RESOLVED | Noted: 2024-07-10 | Resolved: 2025-03-13

## 2025-03-13 PROBLEM — O99.330 TOBACCO USE DURING PREGNANCY, ANTEPARTUM: Status: RESOLVED | Noted: 2024-07-10 | Resolved: 2025-03-13

## 2025-03-13 PROBLEM — Z34.90 PREGNANCY: Status: RESOLVED | Noted: 2024-07-10 | Resolved: 2025-03-13

## 2025-03-13 PROBLEM — O99.320 MARIJUANA USE DURING PREGNANCY: Status: RESOLVED | Noted: 2024-07-10 | Resolved: 2025-03-13

## 2025-03-13 RX ORDER — NORGESTIMATE AND ETHINYL ESTRADIOL 0.25-0.035
1 KIT ORAL DAILY
Qty: 84 TABLET | Refills: 3 | Status: SHIPPED | OUTPATIENT
Start: 2025-03-13 | End: 2025-06-05

## 2025-03-13 NOTE — PROGRESS NOTES
"        Chief Complaint   Patient presents with    Postpartum Care       Postpartum Visit         Onel Zapata is a 24 y.o.  who presents today for a 6 week(s) postpartum check.     C/S: no     Vaginal, Spontaneous   Information for the patient's :  Maria Eugenia Chavira [[id number]]   2025   female   Maria Eugenia Chavira   3100 g (6 lb 13.4 oz)   Gestational Age: 39w6d       Baby Discharged: Discharged with Mom  Delivering Physician: Ann Marie Young    Her pregnancy was complicated by no known issues. The laceration was 2nd degree and is healing well. Patient describes vaginal bleeding as absent.  Patient is bottle feeding.  She desires OCPs for contraception.      She would like to discuss the following complaints today: none.    Patient denies concerns for postpartum depression/anxiety. Patient denies  suicidal or homicidal ideation. Her postpartum depression screening questionnaire: 2. No treatment is indicated      Last Pap : 2023. Results: negative. HPV: unknown .   Last Completed Pap Smear    This patient has no relevant Health Maintenance data.           The additional following portions of the patient's history were reviewed and updated as appropriate: allergies, current medications, past family history, past medical history, past social history, past surgical history, and problem list.    Review of Systems   Constitutional: Negative.    HENT: Negative.     Eyes: Negative.    Respiratory: Negative.     Cardiovascular: Negative.    Gastrointestinal: Negative.    Endocrine: Negative.    Genitourinary: Negative.    Musculoskeletal: Negative.    Skin: Negative.    Allergic/Immunologic: Negative.    Neurological: Negative.    Hematological: Negative.    Psychiatric/Behavioral: Negative.       All other systems reviewed and are negative.     I have reviewed and agree with the HPI, ROS, and historical information as entered above. Edilia Munoz MD      /80   Ht 170.2 cm (67\")   Wt 112 kg (247 lb " 3.2 oz)   LMP 2024   Breastfeeding No   BMI 38.72 kg/m²     Physical Exam  Vitals and nursing note reviewed. Exam conducted with a chaperone present.   Constitutional:       Appearance: She is well-developed.   HENT:      Head: Normocephalic and atraumatic.   Neck:      Thyroid: No thyroid mass or thyromegaly.   Pulmonary:      Breath sounds: No rhonchi.   Abdominal:      Palpations: Abdomen is soft. Abdomen is not rigid. There is no mass.      Tenderness: There is no abdominal tenderness. There is no guarding.      Hernia: No hernia is present.   Genitourinary:     Vagina: Normal.      Cervix: Normal.      Uterus: Normal.    Musculoskeletal:      Cervical back: Normal range of motion. No muscular tenderness.   Neurological:      Mental Status: She is alert and oriented to person, place, and time.   Psychiatric:         Behavior: Behavior normal.             Assessment and Plan    Problem List Items Addressed This Visit       RESOLVED:  (normal spontaneous vaginal delivery) - Primary    RESOLVED: Postpartum care and examination immediately after delivery     Other Visit Diagnoses         Pap test, as part of routine gynecological examination        Relevant Orders    LIQUID-BASED PAP SMEAR WITH HPV GENOTYPING REGARDLESS OF INTERPRETATION ([facility])      Encounter for initial prescription of contraceptive pills                S/p Vaginal delivery, 6 week(s) postpartum.  Doing well.    Return to normal physical activity.  No pelvic restrictions.   Baby doing well.  Bottlefeeding going well.  No si/sx of postpartum depression  Contraception: contraceptive methods: OCP (estrogen/progesterone)  Return for Annual physical.     Edilia Munoz MD  2025

## 2025-03-14 LAB — REF LAB TEST METHOD: NORMAL

## 2025-03-17 ENCOUNTER — TELEPHONE (OUTPATIENT)
Dept: OBSTETRICS AND GYNECOLOGY | Facility: CLINIC | Age: 25
End: 2025-03-17
Payer: MEDICAID

## 2025-03-17 NOTE — TELEPHONE ENCOUNTER
Caller: Onel Zapata    Relationship: Self    Best call back number: [phone]    What form or medical record are you requesting: RETURN TO WORK FROM HAVING HER BABY    Who is requesting this form or medical record from you: PT'S EMPLOYER    How would you like to receive the form or medical records (pick-up, mail, fax): MYCHART    Timeframe paperwork needed: ASAP    Additional notes: PT WAS SEEN ON 3/13/25 FOR HER LAST PP APPT, ASKED FOR A NOTE FOR WORK, WAS GIVEN A WORK EXCUSE NOTE JUST SHOWING SHE WAS SEEN - BUT PT NEEDS TO HAVE A NOTE THAT SHE IS RELEASED TO RETURN TO WORK.    PLEASE CALL PT WITH ANY QUESTIONS      
Lvom for pt to CB  
Need for prophylactic measure

## 2025-03-17 NOTE — LETTER
March 17, 2025     Patient: Onel Zapata   YOB: 2000   Date of Visit: 3/17/2025       To Whom It May Concern:    It is my medical opinion that Onel Zapata may now return to work.            Sincerely,        Edilia Munoz MD    CC: No Recipients

## 2025-08-21 ENCOUNTER — LAB (OUTPATIENT)
Dept: INTERNAL MEDICINE | Facility: CLINIC | Age: 25
End: 2025-08-21
Payer: MEDICAID

## 2025-08-21 ENCOUNTER — OFFICE VISIT (OUTPATIENT)
Dept: INTERNAL MEDICINE | Facility: CLINIC | Age: 25
End: 2025-08-21
Payer: MEDICAID

## 2025-08-21 VITALS
RESPIRATION RATE: 20 BRPM | SYSTOLIC BLOOD PRESSURE: 128 MMHG | HEART RATE: 71 BPM | OXYGEN SATURATION: 97 % | BODY MASS INDEX: 41.28 KG/M2 | TEMPERATURE: 97.6 F | HEIGHT: 67 IN | WEIGHT: 263 LBS | DIASTOLIC BLOOD PRESSURE: 76 MMHG

## 2025-08-21 DIAGNOSIS — Z13.220 SCREENING, LIPID: ICD-10-CM

## 2025-08-21 DIAGNOSIS — Z13.0 SCREENING FOR DEFICIENCY ANEMIA: ICD-10-CM

## 2025-08-21 DIAGNOSIS — Z13.1 SCREENING FOR DIABETES MELLITUS: ICD-10-CM

## 2025-08-21 DIAGNOSIS — E55.9 VITAMIN D DEFICIENCY: ICD-10-CM

## 2025-08-21 DIAGNOSIS — E06.3 HYPOTHYROIDISM DUE TO HASHIMOTO'S THYROIDITIS: Primary | ICD-10-CM

## 2025-08-21 DIAGNOSIS — E66.813 CLASS 3 SEVERE OBESITY DUE TO EXCESS CALORIES WITHOUT SERIOUS COMORBIDITY WITH BODY MASS INDEX (BMI) OF 40.0 TO 44.9 IN ADULT: ICD-10-CM

## 2025-08-21 DIAGNOSIS — E06.3 HYPOTHYROIDISM DUE TO HASHIMOTO'S THYROIDITIS: ICD-10-CM

## 2025-08-21 LAB
25(OH)D3 SERPL-MCNC: 29.8 NG/ML (ref 30–100)
ALBUMIN SERPL-MCNC: 4.2 G/DL (ref 3.5–5.2)
ALBUMIN/GLOB SERPL: 1.1 G/DL
ALP SERPL-CCNC: 60 U/L (ref 39–117)
ALT SERPL W P-5'-P-CCNC: 16 U/L (ref 1–33)
ANION GAP SERPL CALCULATED.3IONS-SCNC: 11 MMOL/L (ref 5–15)
AST SERPL-CCNC: 25 U/L (ref 1–32)
BASOPHILS # BLD AUTO: 0.05 10*3/MM3 (ref 0–0.2)
BASOPHILS NFR BLD AUTO: 1 % (ref 0–1.5)
BILIRUB SERPL-MCNC: 0.3 MG/DL (ref 0–1.2)
BUN SERPL-MCNC: 10 MG/DL (ref 6–20)
BUN/CREAT SERPL: 13.3 (ref 7–25)
CALCIUM SPEC-SCNC: 9.2 MG/DL (ref 8.6–10.5)
CHLORIDE SERPL-SCNC: 103 MMOL/L (ref 98–107)
CHOLEST SERPL-MCNC: 198 MG/DL (ref 0–200)
CO2 SERPL-SCNC: 24 MMOL/L (ref 22–29)
CREAT SERPL-MCNC: 0.75 MG/DL (ref 0.57–1)
DEPRECATED RDW RBC AUTO: 40.6 FL (ref 37–54)
EGFRCR SERPLBLD CKD-EPI 2021: 114.9 ML/MIN/1.73
EOSINOPHIL # BLD AUTO: 0.35 10*3/MM3 (ref 0–0.4)
EOSINOPHIL NFR BLD AUTO: 7.3 % (ref 0.3–6.2)
ERYTHROCYTE [DISTWIDTH] IN BLOOD BY AUTOMATED COUNT: 12.6 % (ref 12.3–15.4)
FERRITIN SERPL-MCNC: 51.3 NG/ML (ref 13–150)
FOLATE SERPL-MCNC: 17.1 NG/ML (ref 4.78–24.2)
GLOBULIN UR ELPH-MCNC: 3.7 GM/DL
GLUCOSE SERPL-MCNC: 79 MG/DL (ref 65–99)
HBA1C MFR BLD: 5.3 % (ref 4.8–5.6)
HCT VFR BLD AUTO: 40.8 % (ref 34–46.6)
HDLC SERPL-MCNC: 62 MG/DL (ref 40–60)
HGB BLD-MCNC: 13.1 G/DL (ref 12–15.9)
IMM GRANULOCYTES # BLD AUTO: 0.01 10*3/MM3 (ref 0–0.05)
IMM GRANULOCYTES NFR BLD AUTO: 0.2 % (ref 0–0.5)
IRON 24H UR-MRATE: 84 MCG/DL (ref 37–145)
IRON SATN MFR SERPL: 19 % (ref 20–50)
LDLC SERPL CALC-MCNC: 120 MG/DL (ref 0–100)
LDLC/HDLC SERPL: 1.91 {RATIO}
LYMPHOCYTES # BLD AUTO: 1.17 10*3/MM3 (ref 0.7–3.1)
LYMPHOCYTES NFR BLD AUTO: 24.4 % (ref 19.6–45.3)
MCH RBC QN AUTO: 28.3 PG (ref 26.6–33)
MCHC RBC AUTO-ENTMCNC: 32.1 G/DL (ref 31.5–35.7)
MCV RBC AUTO: 88.1 FL (ref 79–97)
MONOCYTES # BLD AUTO: 0.4 10*3/MM3 (ref 0.1–0.9)
MONOCYTES NFR BLD AUTO: 8.3 % (ref 5–12)
NEUTROPHILS NFR BLD AUTO: 2.82 10*3/MM3 (ref 1.7–7)
NEUTROPHILS NFR BLD AUTO: 58.8 % (ref 42.7–76)
NRBC BLD AUTO-RTO: 0 /100 WBC (ref 0–0.2)
PLATELET # BLD AUTO: 272 10*3/MM3 (ref 140–450)
PMV BLD AUTO: 10.1 FL (ref 6–12)
POTASSIUM SERPL-SCNC: 3.6 MMOL/L (ref 3.5–5.2)
PROT SERPL-MCNC: 7.9 G/DL (ref 6–8.5)
RBC # BLD AUTO: 4.63 10*6/MM3 (ref 3.77–5.28)
SODIUM SERPL-SCNC: 138 MMOL/L (ref 136–145)
TIBC SERPL-MCNC: 437 MCG/DL (ref 298–536)
TRANSFERRIN SERPL-MCNC: 293 MG/DL (ref 200–360)
TRIGL SERPL-MCNC: 87 MG/DL (ref 0–150)
TSH SERPL DL<=0.05 MIU/L-ACNC: 9.03 UIU/ML (ref 0.27–4.2)
VIT B12 BLD-MCNC: 420 PG/ML (ref 211–946)
VLDLC SERPL-MCNC: 16 MG/DL (ref 5–40)
WBC NRBC COR # BLD AUTO: 4.8 10*3/MM3 (ref 3.4–10.8)

## 2025-08-21 PROCEDURE — 1159F MED LIST DOCD IN RCRD: CPT | Performed by: PHYSICIAN ASSISTANT

## 2025-08-21 PROCEDURE — 1126F AMNT PAIN NOTED NONE PRSNT: CPT | Performed by: PHYSICIAN ASSISTANT

## 2025-08-21 PROCEDURE — 36415 COLL VENOUS BLD VENIPUNCTURE: CPT | Performed by: PHYSICIAN ASSISTANT

## 2025-08-21 PROCEDURE — 99214 OFFICE O/P EST MOD 30 MIN: CPT | Performed by: PHYSICIAN ASSISTANT

## 2025-08-21 PROCEDURE — 1160F RVW MEDS BY RX/DR IN RCRD: CPT | Performed by: PHYSICIAN ASSISTANT

## 2025-08-21 RX ORDER — LEVOTHYROXINE SODIUM 75 UG/1
75 CAPSULE ORAL DAILY
Qty: 90 CAPSULE | Refills: 1 | Status: SHIPPED | OUTPATIENT
Start: 2025-08-21

## 2025-08-21 RX ORDER — BUPROPION HYDROCHLORIDE 150 MG/1
150 TABLET ORAL DAILY
Qty: 90 TABLET | Refills: 1 | Status: SHIPPED | OUTPATIENT
Start: 2025-08-21

## 2025-08-22 LAB — T4 FREE SERPL-MCNC: 0.82 NG/DL (ref 0.92–1.68)

## 2025-08-23 LAB
T4 FREE SERPL-MCNC: 0.79 NG/DL (ref 0.82–1.77)
TSH SERPL DL<=0.005 MIU/L-ACNC: 9.51 UIU/ML (ref 0.45–4.5)

## 2025-08-26 ENCOUNTER — PRIOR AUTHORIZATION (OUTPATIENT)
Dept: INTERNAL MEDICINE | Facility: CLINIC | Age: 25
End: 2025-08-26
Payer: MEDICAID